# Patient Record
Sex: FEMALE | Race: OTHER
[De-identification: names, ages, dates, MRNs, and addresses within clinical notes are randomized per-mention and may not be internally consistent; named-entity substitution may affect disease eponyms.]

---

## 2018-04-11 ENCOUNTER — HOSPITAL ENCOUNTER (OUTPATIENT)
Dept: HOSPITAL 25 - OREAST | Age: 73
Discharge: HOME | End: 2018-04-11
Attending: SPECIALIST
Payer: MEDICARE

## 2018-04-11 VITALS — DIASTOLIC BLOOD PRESSURE: 54 MMHG | SYSTOLIC BLOOD PRESSURE: 129 MMHG

## 2018-04-11 DIAGNOSIS — J45.909: ICD-10-CM

## 2018-04-11 DIAGNOSIS — H35.3132: ICD-10-CM

## 2018-04-11 DIAGNOSIS — H25.812: Primary | ICD-10-CM

## 2018-04-11 DIAGNOSIS — H25.811: ICD-10-CM

## 2018-04-11 DIAGNOSIS — I10: ICD-10-CM

## 2018-04-11 DIAGNOSIS — E78.5: ICD-10-CM

## 2018-04-11 DIAGNOSIS — H40.1424: ICD-10-CM

## 2018-04-11 DIAGNOSIS — M81.0: ICD-10-CM

## 2018-04-11 DIAGNOSIS — M19.90: ICD-10-CM

## 2018-04-11 PROCEDURE — V2632 POST CHMBR INTRAOCULAR LENS: HCPCS

## 2018-04-11 NOTE — OP
OPERATIVE NOTE:

 

DATE OF OPERATION:  04/11/18

 

DATE OF BIRTH:  [birth year]

 

SURGEON:  Denton Romo M.D.

 

PREOPERATIVE DIAGNOSIS:  Cataract, left eye.

 

POSTOPERATIVE DIAGNOSIS:  Cataract, left eye.

 

OPERATIVE PROCEDURE:  Extracapsular cataract extraction with intraocular lens implant and CTR left ey
e.

 

PROCEDURE:  The patient was brought to the operating room after being given 1/2% Alcaine with epineph
rine drops in the preoperative area.  The eye was prepped and draped in the usual sterile fashion.  S
terile drape and eyelid speculum were placed.  Again, topical 1/2% Alcaine with epinephrine was given
.  A paracentesis incision was made at the 3 o'clock position with the No.75 blade.  Clear cornea inc
ision 2.2 x 2.2-mm was created at the 6 o'clock position starting at the anterior limbus using the 2.
2-mm keratome.  The anterior chamber was irrigated with 0.4 mL of 1% non-preservative intracameral li
docaine and filled with DisCoVisc.  A capsulorrhexis was completed using the cystotome and the Utrata
 forceps. Hydrodissection was performed with balanced salt solution.  The lens nucleus was removed wi
th the Phacoemulsification handpiece without incident.  Cortex was removed with the irrigation-aspira
tion handpiece.  The capsular bag was re-inflated using DisCoVisc and an SN60WF 18.5 implant was inse
rted with the shooter followed by a capsular tension ring.  A CTR 11 inserted with the shooter into t
he capsular bag.  Because of the small pupil, a Malyugin ring was placed prior to capsulorrhexis, rem
jose ramon after insertion of the capsular tension ring.  Indication for complex cataract surgery pseudoexf
oliation requiring capsular tension ring. Pupil abnormalities requiring pupil dilation device.  The i
rrigation-aspiration handpiece was used to remove all residual DisCoVisc.  The eye was refilled with 
balanced salt solution and the wound checked and found to be watertight.  Topical Maxitrol drops were
 given.

 

 [id number] #: [id number]

## 2018-08-08 ENCOUNTER — HOSPITAL ENCOUNTER (OUTPATIENT)
Dept: HOSPITAL 25 - OREAST | Age: 73
Discharge: HOME | End: 2018-08-08
Attending: SPECIALIST
Payer: MEDICARE

## 2018-08-08 VITALS — SYSTOLIC BLOOD PRESSURE: 127 MMHG | DIASTOLIC BLOOD PRESSURE: 73 MMHG

## 2018-08-08 DIAGNOSIS — J45.909: ICD-10-CM

## 2018-08-08 DIAGNOSIS — H25.811: Primary | ICD-10-CM

## 2018-08-08 DIAGNOSIS — E78.00: ICD-10-CM

## 2018-08-08 DIAGNOSIS — I10: ICD-10-CM

## 2018-08-08 DIAGNOSIS — E78.5: ICD-10-CM

## 2018-08-08 DIAGNOSIS — H35.3132: ICD-10-CM

## 2018-08-08 PROCEDURE — V2632 POST CHMBR INTRAOCULAR LENS: HCPCS

## 2018-08-09 NOTE — OP
DATE OF OPERATION:  08/08/18 [facility]

 

DATE OF BIRTH:  [birth year]

 

SURGEON:  Denton Romo M.D.

 

PREOPERATIVE DIAGNOSIS:  Cataract, right eye.

 

POSTOPERATIVE DIAGNOSIS:  Cataract, right eye.

 

OPERATIVE PROCEDURE:  Extracapsular cataract extraction with intraocular lens 
implant, right eye.

 

DESCRIPTION OF PROCEDURE:  The patient was brought to the operating room after 
being given 1/2% Alcaine with epinephrine drops in the preoperative area.  The 
eye was prepped and draped in the usual sterile fashion.  Sterile drape and 
eyelid speculum were placed.  Again, topical 1/2% Alcaine with epinephrine was 
given.  A paracentesis incision was made at the 9 o'clock position with the 
No.75 blade.  Clear cornea incision 2.2 x 2.2-mm was created at the 12 o'clock 
position starting at the anterior limbus using the 2.2-mm keratome.  The 
anterior chamber was irrigated with 0.4 mL of 1% non-preservative intracameral 
lidocaine and filled with DisCoVisc.  A capsulorrhexis was completed using the 
cystotome and the Utrata forceps. Hydrodissection was performed with balanced 
salt solution.  The lens nucleus was removed with the Phacoemulsification 
handpiece without incident.  Cortex was removed with the irrigation-aspiration 
handpiece.  The capsular bag was re-inflated using DisCoVisc and an SN60WF 17.5 
implant was inserted with the shooter. The irrigation-aspiration handpiece was 
used to remove all residual DisCoVisc. The eye was refilled with balanced salt 
solution and the wound checked and found to be watertight.  Topical Maxitrol 
drops were given.

 

 [id number]/CPS #: [id number]

[facility]

## 2019-07-22 NOTE — HP
HISTORY AND PHYSICAL:

 

DATE OF SURGERY:  07/30/19

 

DATE OF OFFICE VISIT:  07/17/19

 

SURGEON:  Shabana Vargas MD.* (DICTATED BY YOLANDA PIZANO)

 

PROCEDURE:  Left total hip arthroplasty.

 

CHIEF COMPLAINT:  Left hip pain.

 

HISTORY OF PRESENT ILLNESS:  Ms. Barillas is a 74-year-old female, who has 
known severe arthritis in her left hip.  She reports 6/10 aching pain in the 
left groin. This is a daily aching pain in the left groin.  She uses Tylenol 
and has had physical therapy in the past.  She is now doing some home 
exercises.  She does not use a cane.  She reports that her hip pain is 
increasing in intensity over the last 3 years.  She does report aquatherapy 
helped her.  She does not interested in intraocular injection at this time.

 

PAST MEDICAL HISTORY:

1.  Hypertension.

2.  Hypercholesterolemia.

3.  Osteoarthritis.

4.  GERD.

 

PAST SURGICAL HISTORY:  Bilateral cataract excision in 2018.

 

MEDICATIONS:

1.  Losartan potassium 25 mg one p.o. daily.

2.  Hydrochlorothiazide 12.5 mg one p.o. daily.

3.  Lansoprazole sodium 40 mg 1 p.o. daily.

4.  ______ 1200 mg 1 tab p.o. daily.

5.  Vitamin D p.o. daily.

6.  Vitamin D3 super strength 2000 units 2 tablets p.o. daily.

7.  Co Q10 at 30 mg p.o. daily.

 

ALLERGIES:  CORTISONE.

 

FAMILY HISTORY:  Coronary artery disease and hypertension.

 

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 
home.  She denies any tobacco or recreational drug use or alcohol use.

 

REVIEW OF SYSTEMS:  Negative for general, cephalic, CV, respiratory, GI, , or 
other musculoskeletal, integumentary, endocrine, neurologic, hematologic 
symptoms. Infectious Diseases:  Negative for MRSA, hep C, or HIV.  Negative for 
history of DVT, seizures, kidney disorders, TIA, or stroke.

 

                               PHYSICAL EXAMINATION

 

GENERAL:  Well-developed, well-nourished 74-year-old female, in no acute 
distress.

 

VITAL SIGNS:  Height 63 inches, weight 166 pounds, pulse 68, /78, 
respirations 14, temperature 97.1.  BMI 29.4.

 

HEENT:  Normocephalic, atraumatic.  PERRLA.  Extraocular movements intact.

 

NECK:  Supple.  No palpable lymph nodes.  Throat is clear.

 

PULMONARY:  Lungs are clear to auscultation bilaterally.  No wheezes, rales, 
rhonchi.

 

CARDIO:  Regular rate and rhythm.  S1, S2 normal.  No murmurs, rubs, or 
gallops. No edema.

 

ABDOMEN:  Positive bowel sounds, soft, nontender.

 

MUSCULOSKELETAL:  Left lower extremity, the patient's skin is intact.  No 
abrasions or open wounds.  No palpable masses or lymph nodes.  Range of motion, 
hip flexion is 90 degrees with some groin pain.  She lacks 5 degrees from 
neutral.  She has 20 degrees external rotation with groin pain.  She can 
actively flex and abduct the hip.  Distally, there is no edema, varicosities, 
or hyperreflexia.  She has a 5/5 strength for ankle dorsiflexion and plantar 
flexion bilaterally.  Sensation to light touch is intact with 2+ DP pulses 
bilaterally.

 

NEUROLOGIC:  A and O x3.  Cranial nerves II through XII intact.  Sensation is 
intact to light touch.

 

 DIAGNOSTIC DATA/LAB DATA:  Radiographs:  Multiple views of the left hip shows 
severe endstage arthritis with medial bone-on-bone contact, joint space 
narrowing, osteophyte formation, and subchondral sclerosis.

 

IMPRESSION:  Left hip severe osteoarthritis.

 

PLAN:  The patient is scheduled to undergo left total hip replacement on 07/26/
19. Dr. Vargas discussed the procedure as well as risks and benefits with the 
patient. She agreed to proceed.  She will return to the office 10 to 14 days 
postop for followup and suture removal.  Upon discharge from the hospital, she 
will be e- scribed a prescription for Percocet for postoperative pain 
management.  I-STOP was performed today in the office and was negative.

 

 ____________________________________ YOLANDA PIZANO

 

[id number]/CPS #: [id number]

JOYCE

## 2019-07-26 ENCOUNTER — HOSPITAL ENCOUNTER (INPATIENT)
Dept: HOSPITAL 25 - AA | Age: 74
LOS: 6 days | Discharge: HOME HEALTH | DRG: 470 | End: 2019-08-01
Attending: ORTHOPAEDIC SURGERY | Admitting: ORTHOPAEDIC SURGERY
Payer: MEDICARE

## 2019-07-26 DIAGNOSIS — M85.88: ICD-10-CM

## 2019-07-26 DIAGNOSIS — Z82.49: ICD-10-CM

## 2019-07-26 DIAGNOSIS — I49.3: ICD-10-CM

## 2019-07-26 DIAGNOSIS — J30.2: ICD-10-CM

## 2019-07-26 DIAGNOSIS — K44.9: ICD-10-CM

## 2019-07-26 DIAGNOSIS — E83.42: ICD-10-CM

## 2019-07-26 DIAGNOSIS — Z72.89: ICD-10-CM

## 2019-07-26 DIAGNOSIS — E78.00: ICD-10-CM

## 2019-07-26 DIAGNOSIS — K21.9: ICD-10-CM

## 2019-07-26 DIAGNOSIS — R94.31: ICD-10-CM

## 2019-07-26 DIAGNOSIS — I10: ICD-10-CM

## 2019-07-26 DIAGNOSIS — I08.1: ICD-10-CM

## 2019-07-26 DIAGNOSIS — Z88.8: ICD-10-CM

## 2019-07-26 DIAGNOSIS — I47.2: ICD-10-CM

## 2019-07-26 DIAGNOSIS — Z98.42: ICD-10-CM

## 2019-07-26 DIAGNOSIS — Z98.41: ICD-10-CM

## 2019-07-26 DIAGNOSIS — E78.5: ICD-10-CM

## 2019-07-26 DIAGNOSIS — M54.30: ICD-10-CM

## 2019-07-26 DIAGNOSIS — M16.12: Primary | ICD-10-CM

## 2019-07-26 DIAGNOSIS — D64.9: ICD-10-CM

## 2019-07-26 PROCEDURE — 93306 TTE W/DOPPLER COMPLETE: CPT

## 2019-07-26 PROCEDURE — 80053 COMPREHEN METABOLIC PANEL: CPT

## 2019-07-26 PROCEDURE — 80048 BASIC METABOLIC PNL TOTAL CA: CPT

## 2019-07-26 PROCEDURE — 93005 ELECTROCARDIOGRAM TRACING: CPT

## 2019-07-26 PROCEDURE — 85014 HEMATOCRIT: CPT

## 2019-07-26 PROCEDURE — 85018 HEMOGLOBIN: CPT

## 2019-07-26 PROCEDURE — C1776 JOINT DEVICE (IMPLANTABLE): HCPCS

## 2019-07-26 PROCEDURE — 82310 ASSAY OF CALCIUM: CPT

## 2019-07-26 PROCEDURE — 36415 COLL VENOUS BLD VENIPUNCTURE: CPT

## 2019-07-26 PROCEDURE — C1713 ANCHOR/SCREW BN/BN,TIS/BN: HCPCS

## 2019-07-26 PROCEDURE — 85025 COMPLETE CBC W/AUTO DIFF WBC: CPT

## 2019-07-26 PROCEDURE — 84484 ASSAY OF TROPONIN QUANT: CPT

## 2019-07-26 PROCEDURE — 85049 AUTOMATED PLATELET COUNT: CPT

## 2019-07-26 PROCEDURE — 83735 ASSAY OF MAGNESIUM: CPT

## 2019-07-30 LAB
ALBUMIN SERPL BCG-MCNC: 4 G/DL (ref 3.2–5.2)
ALBUMIN/GLOB SERPL: 1.5 {RATIO} (ref 1–3)
ALP SERPL-CCNC: 48 U/L (ref 34–104)
ALT SERPL W P-5'-P-CCNC: 20 U/L (ref 7–52)
ANION GAP SERPL CALC-SCNC: 8 MMOL/L (ref 2–11)
AST SERPL-CCNC: 32 U/L (ref 13–39)
BASOPHILS # BLD AUTO: 0 10^3/UL (ref 0–0.2)
BUN SERPL-MCNC: 16 MG/DL (ref 6–24)
BUN/CREAT SERPL: 19.8 (ref 8–20)
CALCIUM SERPL-MCNC: 9 MG/DL (ref 8.6–10.3)
CALCIUM SERPL-MCNC: 9.4 MG/DL (ref 8.6–10.3)
CHLORIDE SERPL-SCNC: 103 MMOL/L (ref 101–111)
EOSINOPHIL # BLD AUTO: 0.1 10^3/UL (ref 0–0.6)
GLOBULIN SER CALC-MCNC: 2.7 G/DL (ref 2–4)
GLUCOSE SERPL-MCNC: 101 MG/DL (ref 70–100)
HCO3 SERPL-SCNC: 29 MMOL/L (ref 22–32)
HCT VFR BLD AUTO: 37 % (ref 35–47)
HGB BLD-MCNC: 12.6 G/DL (ref 12–16)
LYMPHOCYTES # BLD AUTO: 1.7 10^3/UL (ref 1–4.8)
MAGNESIUM SERPL-MCNC: 1.6 MG/DL (ref 1.9–2.7)
MCH RBC QN AUTO: 31 PG (ref 27–31)
MCHC RBC AUTO-ENTMCNC: 34 G/DL (ref 31–36)
MCV RBC AUTO: 92 FL (ref 80–97)
MONOCYTES # BLD AUTO: 0.5 10^3/UL (ref 0–0.8)
NEUTROPHILS # BLD AUTO: 5.9 10^3/UL (ref 1.5–7.7)
NRBC # BLD AUTO: 0 10^3/UL
NRBC BLD QL AUTO: 0
PLATELET # BLD AUTO: 222 10^3/UL (ref 150–450)
POTASSIUM SERPL-SCNC: 4.1 MMOL/L (ref 3.5–5)
PROT SERPL-MCNC: 6.7 G/DL (ref 6.4–8.9)
RBC # BLD AUTO: 4.01 10^6 /UL (ref 3.7–4.87)
SODIUM SERPL-SCNC: 140 MMOL/L (ref 135–145)
WBC # BLD AUTO: 8.2 10^3/UL (ref 3.5–10.8)

## 2019-07-30 PROCEDURE — 0SRB04A REPLACEMENT OF LEFT HIP JOINT WITH CERAMIC ON POLYETHYLENE SYNTHETIC SUBSTITUTE, UNCEMENTED, OPEN APPROACH: ICD-10-PCS | Performed by: ORTHOPAEDIC SURGERY

## 2019-07-30 RX ADMIN — MAGNESIUM HYDROXIDE SCH ML: 400 SUSPENSION ORAL at 22:19

## 2019-07-30 RX ADMIN — ACETAMINOPHEN SCH MG: 325 TABLET ORAL at 16:18

## 2019-07-30 RX ADMIN — OXYCODONE HYDROCHLORIDE AND ACETAMINOPHEN PRN TAB: 5; 325 TABLET ORAL at 17:56

## 2019-07-30 RX ADMIN — HYDROMORPHONE HYDROCHLORIDE PRN MG: 1 INJECTION, SOLUTION INTRAMUSCULAR; INTRAVENOUS; SUBCUTANEOUS at 14:01

## 2019-07-30 RX ADMIN — DOCUSATE SODIUM SCH MG: 100 CAPSULE, LIQUID FILLED ORAL at 22:19

## 2019-07-30 RX ADMIN — OXYCODONE HYDROCHLORIDE AND ACETAMINOPHEN PRN TAB: 5; 325 TABLET ORAL at 17:03

## 2019-07-30 RX ADMIN — HYDROMORPHONE HYDROCHLORIDE PRN MG: 1 INJECTION, SOLUTION INTRAMUSCULAR; INTRAVENOUS; SUBCUTANEOUS at 14:06

## 2019-07-30 RX ADMIN — CEFAZOLIN SCH MLS/HR: 1 INJECTION, POWDER, FOR SOLUTION INTRAVENOUS at 17:03

## 2019-07-30 RX ADMIN — PANTOPRAZOLE SODIUM SCH MG: 40 TABLET, DELAYED RELEASE ORAL at 17:03

## 2019-07-30 RX ADMIN — APIXABAN SCH MG: 2.5 TABLET, FILM COATED ORAL at 22:18

## 2019-07-30 RX ADMIN — HYDROMORPHONE HYDROCHLORIDE PRN MG: 1 INJECTION, SOLUTION INTRAMUSCULAR; INTRAVENOUS; SUBCUTANEOUS at 14:13

## 2019-07-30 RX ADMIN — HYDROMORPHONE HYDROCHLORIDE PRN MG: 1 INJECTION, SOLUTION INTRAMUSCULAR; INTRAVENOUS; SUBCUTANEOUS at 13:06

## 2019-07-30 RX ADMIN — HYDROMORPHONE HYDROCHLORIDE PRN MG: 1 INJECTION, SOLUTION INTRAMUSCULAR; INTRAVENOUS; SUBCUTANEOUS at 13:21

## 2019-07-30 RX ADMIN — SODIUM CHLORIDE, SODIUM LACTATE, POTASSIUM CHLORIDE, AND CALCIUM CHLORIDE SCH MLS/HR: 600; 310; 30; 20 INJECTION, SOLUTION INTRAVENOUS at 17:14

## 2019-07-30 NOTE — CONS
CC:  Dr. Velasquez; Dr. Ortiz; Dr. Vargas

 

CONSULTATION REPORT:

 

DATE OF CONSULT:  07/30/19

 

PRIMARY CARE PROVIDER:  Dr. Velasquez.

 

REASON FOR CONSULT:  Medical management of postoperative patient of Dr. Vargas's.

 

CHIEF COMPLAINT:  Left hip pain.

 

HISTORY OF PRESENT ILLNESS:  Graciela Barillas is a 74-year-old female with 
history of hypertension, osteoarthritis, dyslipidemia who was seen in the 
postoperative unit after a left total hip replacement performed by Dr. Vargas 
today.  Her surgery was mainly uneventful apart from that that the 
anesthesiologist noted that the patient had ST depression in lateral leads that 
resolved by the time of the end of her surgery.  When the patient was awoken 
from anesthesia, she had no complaints of chest pain or shortness of breath. 
She had spinal anesthesia and sedation.  The patient right now complains of 
postoperative pain in the left hip.  She has no other complaints.  The patient 
has history of cardiac catheterization obtained in 2010 which was unremarkable. 
She sees Dr. Ortiz for her cardiac issues.

 

PAST MEDICAL HISTORY:

1.  History of hypertension.

2.  Hyperlipidemia.

3.  History of PVCs.

4.  History of osteoarthritis of the left hip, status post left hip replacement 
performed on 07/30/19.

5.  History of cardiac catheterization in 2010 which was unremarkable.

 

MEDICATIONS AT HOME:  Include:

1.  Coenzyme Q10 30 mg a day.

2.  Vitamin D3 2000 units, the patient actually takes 4000 units daily.

3.  Vitamin B 100 daily.

4.  Losartan 25 mg daily.

5.  Hydrochlorothiazide 12.5 mg daily.

6.  Protonix 40 mg daily.

7.  Lecithin 1200 mg daily.

 

ALLERGIES:  Include CORTISONE.

 

FAMILY HISTORY:  Father with history of MIs and atrial fibrillation.  Mother 
with history of hypertension, benign kidney tumor.  One of the patient's 
brothers had bypass.

 

SOCIAL HISTORY:  The patient is retired as a realtor.  She denies any tobacco, 
alcohol, or drug use.  As her surrogate, she names her daughter.

 

REVIEW OF SYSTEMS:  Please see history of present illness.  All the remaining 
12 systems were reviewed with the patient and were otherwise negative.

 

PHYSICAL EXAM:  Blood pressure of 137/71, heart rate of 59 beats per minute and 
regular, respiratory rate 15, oxygen saturation 95% on room air, temperature of 
96.8.  General:  The patient is a very pleasant 74-year-old female who is in no 
acute distress, alert, awake, and oriented x3.  HEENT:  Head:  Atraumatic, 
normocephalic.  Eyes:  Pupils are equal, reactive to light and accommodation. 
Oropharynx is clear.  Mucosa moist.  Neck:  Supple.  No JVD.  No bruits 
bilaterally.  Cardiovascular:  Regular rate and rhythm.  No murmur.  Respiratory
: Clear to auscultation bilaterally.  Abdomen:  Soft, nontender.  Bowel sounds 
are present in all 4 quadrants.  Extremities:  There is no edema.  Pulses are 2
+ bilaterally.  No clubbing or cyanosis.  The left hip is in postoperative 
dressings and that were not removed for evaluation.  Neuro Evaluation:  Speech 
is clear. Cranial nerves II through XII are grossly intact.  Motor strength is 5
/5 bilaterally.

 

DIAGNOSTIC STUDIES/LAB DATA:  Laboratory data at this time none.

 

The patient's EKG showed normal sinus rhythm with ST depressions in lateral 
leads, unchanged from EKG from June 2019.

 

ASSESSMENT AND PLAN:

1.  Postoperative left hip, that is going to be managed per orthopedic service. 
The patient already was placed on Eliquis.

2.  In regards to the patient's history of hypertension, hydrochlorothiazide 
and losartan are going to be held on the first day postoperatively.

3.  EKG changes, intraoperative.  At this point, we will obtain troponin.  The 
patient's EKG is unchanged from prior.  If the patient's troponin is 
unremarkable, the patient likely will be okay on surgical floor with telemetry 
monitoring.  If the patient's troponin is elevated, the patient will require to 
be placed on cardiac floor on telemetry monitoring.  At this point, the patient 
is asymptomatic.

4.  For DVT prophylaxis, the patient is going to be continued on Eliquis, 
already started by surgical team.

 

TIME SPENT:  Approximately 55 minutes was spent in consultation of this patient
, more than half the time was spent face-to-face with the patient during the 
interview, physical exam.

 

Thank you very much for allowing our service to participate in your patient's 
care. We will see the patient on a daily basis.

 

 [id number]/CPS #: [id number]

JOYCE

## 2019-07-30 NOTE — OP
Operative Report - Blank





- Operative Report


Date of Operation: 07/30/19


Note: 





JERZY CARDOZA





1945





Date Of Surgery: 7/30/19





Shabana Vargas MD





Assistant: UMA GUERRERO did help throughout the procedure with preparation 

of the hip, wound retraction, manipulation of the hip, and wound closure.  





Anesthesiologist: KONRAD Berry MD





Anesthesia Type: Spinal 





Preoperative Diagnosis: Left severe degenerative osteoarthritis of the hip





Postoperative Diagnosis: As above





Procedure Performed: Left Total Hip Arthroplasty





Complications: None





Specimen: Femoral head and acetabular reamings sent to pathology. 


   


Hardware used: This is uncemented Zain total hip arthroplasty hardware  for 

the femur a size 5 accolade II with 127 degree neck femoral component, for the 

acetabulum a size 50D trident II tritanium  cluster hole shell, two screws - 

length 15mm and 20 mm,  for the insert a size 36D polyethylene trident x3 insert

, and for the femoral head a size 36 - 2.5 ceramic biolox V40 femoral head.  





Brief history/Indication:  JERZY CARDOZA was known in clinic and had a 

history of severe left hip pain. She failed conservative treatment with anti-

inflammatories, pain pills, intra-articular injections and physical therapy. 

She elected to undergo left total hip arthroplasty due to continued pain and 

decreased quality of life. Radiographs showed severe end stage osteoarthritis 

of the hip with bone on bone contact.  Informed consent was obtained from the 

patient. She understood the risks of surgery included but were not limited to: 

bleeding, infection, damage to nearby structures, intraoperative fracture, 

nerve palsy, failure of the hardware, early loosening, stiffness or loss of 

motion, dislocation, leg length discrepancy, anesthesia complications, stroke, 

heart attack, blood clot and death. She wished to proceed.  





Intra-Operative findings: Intraoperatively the patient was noted to have severe 

loss of cartilage of the acetabulum and femoral head.  She had extreme wear 

medially in the acetabulum with extensive sclerosis of the bone and thinning of 

the posterior wall.  She was noted to have osteopenia of the femoral bone.    





Description of the Procedure: 





JERZY CARDOZA was identified in the preanesthesia unit. Her left hip was 

marked as the correct operative side. Informed consent was signed and placed in 

the chart. The patient was taken to the operating room and placed under 

anesthesia without complication. A wilburn catheter was placed. The patient was 

placed on the peg board with all bony prominences well padded. The left lower 

extremity was prepped and draped in the usual sterile fashion. Preoperative time

-out was made to correctly identify the patient, side and site. Appropriate 

intraoperative antibiotics were given within one hour of incision.  





A standard posterior incision was made and carried sharply down to the lateral 

fascia. A new 10 blade was used to make an incision in the fascia in line with 

the skin incision. A charnley retractor was placed.  The piriformis and 

conjoined tendons were identified and elevated off the posterolateral femur 

using electrocautery. These were tagged with number 5 Ethibond. Next 

electrocautery was used to make a posterolateral capsular flap and this was 

tagged with number 5 Ethibonds. The hip was carefully dislocated. 


   


Lesser trochanter to the center of the femoral head was measured at 55 mm. The 

oscillating saw was used to make the femoral neck cut. The femoral head was 

carefully removed. The femur was retracted anteriorly and the acetabular 

retractors were placed. Long-handled knife was used to sharply remove any 

remaining labrum from the acetabular rim. The acetabulum was sequentially 

reamed up to a size 49.  A bleeding subchondral bone bed was obtained. A trial 

liner was placed and had excellent fit and stability. A trident II tritanium 

50D with 2 screws was placed and had excellent stability with appropriate 

anteversion and abduction angle. A size 36D polyethylene liner was impacted 

into the acetabular shell. The liner was checked for stability and was stable.  





Next attention was turned to preparation of the femoral canal. A canal finder 

was used to enter the proximal femur. The femoral canal was sequentially 

broached up to a size 5 femoral broach trial. A trial neck and 36 - 2.5 trial 

femoral head was chosen. Lesser trochanter to center of the femoral head 

measurement was satisfactory. The hip was reduced and taken through a range of 

motion.  The hip was stable in all positions with good soft tissue tension and 

appropriate leg lengths. The hip was dislocated and all trials were removed.  





The final implant chosen was a accolade II size 5 with 127 degree neck. This 

stem was impacted into the femoral canal without difficulty. The stem was 

stable with appropriate anteversion. The femoral head chosen was a 36 - 2.5 

ceramic head.   The head was impacted onto the femoral neck without difficulty. 

The final lesser trochanter to center of the femoral head measurement was 

satisfactory. The hip was reduced and taken through a range of motion. The hip 

was stable in all positions with good soft tissue tension and appropriate leg 

lengths. The hip was copiously irrigated with sterile saline.  





The previously tagged capsule and tendons were repaired to the posterolateral 

femur through two trochanteric drill holes. The lateral fascia layer was closed 

using number 1 vicryls. The rest of the incision was closed in a layered 

fashion using 0 and 2-0 vicryls. The skin was closed using 3-0 monocryl suture 

and Dermabond.  Sterile adaptic, 4x4s and paper tape was used to cover the 

incision. The patients anesthesia was reversed without difficulty. She was 

taken to the PACU in stable condition. Intended weight-bearing will be as 

tolerated with posterior hip precautions.

## 2019-07-30 NOTE — PN
Progress Note





- Progress Note


Date of Service: 07/30/19


Note: 


Patient seen in PACU s/p Left total hip arthroplasty.  Family present at 

bedside.  States she will be monitored in tele over night.  She is alert and 

oriented.  Just starting to have some left hip pain.  Denies nausea. + DF left 

foot.  Sensation and circulation intact distally.

## 2019-07-30 NOTE — ECHO
*[facility]*

[place] [address]

Phone: [phone]

Fax #: [phone]



-------------------------------------------------------------------

Transthoracic Echocardiogram



Patient: Graciela Barillas                  MRN:        [id number]

:     1945                         Study Date: 2019

Age:     74                                 Accession#: [id number]

Gender:  F                                  HR:         57 bpm

Height:  63 in /160 cm                      BSA:        1.78 m^2

Weight:  163.7 lb /74.4 kg                  BMI:        29.1 kg/m^2



*Sonographer: * Niurka [facility]

 

*Referring Physician: * Kori Castelan

*Reading Physician: * Scott Ramirez MD



-------------------------------------------------------------------

Indications:   Abnormal EKG.



-------------------------------------------------------------------

History:   Ventricular ectopy.  Risk factors:  Hypertension.

Dyslipidemia.



-------------------------------------------------------------------

Conclusions



Summary:



- Left ventricle: Systolic function is normal. The estimated

  ejection fraction is 55-60%. Wall motion is normal; there are no

  regional wall motion abnormalities.

- Mitral valve: There is trace regurgitation.

- Aortic valve: There is no evidence of stenosis. There is no

  significant regurgitation.

- Tricuspid valve: There is mild regurgitation.

- Pericardium, extracardiac: There is no significant pericardial

  effusion.

- Study data: No prior study is available for comparison.



-------------------------------------------------------------------

Study data:  Transthoracic echocardiogram.  Procedure:

Transthoracic echocardiography was performed. Image quality was

fair. The study was technically limited due to restricted patient

mobility. S/P left hip replacement.  Complete 2D, spectral Doppler,

and color flow Doppler.  Location:  Recovery room.  Patient status:

 Inpatient. Patient room number: 14. No prior study is available

for comparison.  Rhythm:  Bradycardia.



-------------------------------------------------------------------

Findings



Left ventricle:  The cavity size is normal. Wall thickness is

mildly increased. Systolic function is normal. The estimated

ejection fraction is 55-60%. Wall motion is normal; there are no

regional wall motion abnormalities. There is no consistent Doppler

evidence of clinically significant diastolic dysfunction.

Right ventricle:  The cavity size is normal. Systolic function is

normal.

Left atrium:  The atrium is at the upper limits of normal in size.

Right atrium:  The atrium is normal in size.

Mitral valve:  The leaflets are normal thickness.  There is no

evidence of stenosis.   There is trace regurgitation.

Aortic valve:  The leaflets are normal thickness.  There is no

evidence of stenosis.   There is no significant regurgitation.

Tricuspid valve:  The leaflets are normal thickness.  There is no

evidence of stenosis.   There is mild regurgitation.

Pulmonic valve:   Not well visualized. There is no significant

regurgitation.

Aorta:  Aortic root: The aortic root is poorly visualized.

The aortic arch appears normal.

Pericardium:  There is no significant pericardial effusion.

Pulmonary arteries:

Not well visualized. Systolic pressure is within the normal range.

Systemic veins:

Inferior vena cava: The vessel is normal in size. There is (&gt;= 50%)

respiratory change in the IVC dimension.



-------------------------------------------------------------------

Measurements



 Left ventricle              Value        Ref       Aortic valve continued      Value       Ref

 JOJO, LAX            (L)     3.7   cm     3.8 -     VTI, S                      27.4  cm    -----

                                          5.2       Mean grad, S                2.0   mm Hg -----

 ESD, LAX                    2.7   cm     2.2 -     Peak grad, S                4.0   mm Hg -----

                                          3.5

 FS, LAX                     27    %      27 - 45   Mitral valve                Value       Ref

 PW, ED, LAX         (H)     1.0   cm     0.6 -     Peak E                      0.65  m/sec -----

                                          0.9       Peak A                      0.85  m/sec -----

 EF                  (L)     53    %      54 - 74   Decel time                  227   ms    -----

 E&apos;, lat alex, TDI    (L)     6.4   cm/sec &gt;=10.0    Peak E/A ratio              0.8         -
----

 E/e&apos;, lat alex, TDI          10           --------

 E&apos;, med alex, TDI    (L)     6.7   cm/sec &gt;=7.0     Pulmonic valve              Value       R
ef

 E/e&apos;, med alex, TDI          10           --------  Peak v, S                   0.41  m/sec ----
-

 E&apos;, avg, TDI                6.6   cm/sec --------  Peak grad, S                1.0   mm Hg ----
-

 E/e&apos;, avg, TDI              10           &lt;=14

                                                    Tricuspid valve             Value       Ref

 LVOT                        Value        Ref       TR peak v                   2.3   m/sec &lt;=2.8

 Peak lili, S                 0.84  m/sec  --------  Peak RV-RA grad, S          21    mm Hg -----

 Mean grad, S                1     mm Hg  --------

                                                    Aortic arch                 Value       Ref

 Ventricular septum          Value        Ref       Arch diam                   2.5   cm    -----

 IVS, ED             (H)     1.1   cm     0.6 -

                                          0.9       Decending aorta             Value       Ref

                                                    Ej peak lili                0.38  m/sec -----

 Right ventricle             Value        Ref

 JOJO, LAX                    3.2   cm     --------  Pulmonary artery            Value       Ref

 JOJO minor ax, A4C   (H)     3.8   cm     1.9 -     Pressure, S                 28.0  mm Hg -----

 mid                                      3.5

 Pressure, S                 29    mm Hg  --------  Inferior vena cava          Value       Ref

                                                    Diam                        1.7   cm    -----

 Left atrium                 Value        Ref

 ML dim, A4C                 3.8   cm     --------  Pulmonary veins             Value       Ref

 SI dim, A4C                 5.5   cm     --------  Peak v, S                   0.56  m/sec -----

 Vol/bsa, ES, A/L            34    ml/m^2 16 - 34   Peak v, D                   0.32  m/sec -----

                                                    Peak S/D ratio              1.7         -----

 Right atrium                Value        Ref       A rev duration              127   ms    -----

 SI dim, ES                  4.1   cm     3.4 -

                                          5.3

 ML dim, ES, A4C             4.0   cm     2.6 -

                                          4.4

 Estimated RAP               8     mm Hg  --------

 

 Aortic valve                Value        Ref

 Peak v, S                   1.06  m/sec  --------

 

Legend:

(L)  and  (H)  tsering values outside specified reference range.



Prepared and electronically signed by



Scott Ramirez MD

2019 16:20

## 2019-07-30 NOTE — XMS REPORT
Continuity of Care Document (CCD)

 Created on:2019



Patient:Jerzy Barillas

Sex:Female

:1945

External Reference #:[id number]





Demographics







 Address  [address] [place] [address]

 

 Home Phone  [phone]

 

 Mobile Phone  [phone]

 

 Work Phone  [phone]    ;ext=mobil

 

 Email Address  [email or web address]

 

 Preferred Language  en

 

 Marital Status  Not  or 

 

 Druze Affiliation  Unknown

 

 Race  White

 

 Ethnic Group  Not  or 









Author







 Name  Billy Velasquez MD

 

 Address  [address] [place] [address]









Support







 Name  Relationship  Address  Phone

 

 Aditi Barillas  Child  Unavailable  [phone]

 

 Sheela Barillas  Child  Unavailable  [phone]









Care Team Providers







 Name  Role  Phone

 

 Billy Velasquez MD FACP  Primary Care Physician  Unavailable









Payers







 Date  Identification Numbers  Payment Provider  Subscriber

 

   Policy Number: [id number]  [employer]-PFFS Medicare Plan  Jerzy Barillas









 Group Number: [id number]   [address] [id number]

 

 PayID: [id number]  [place] [id number]







Problems







 Active Problems  Provider  Date

 

 Acquired kyphosis  Billy Velasquez MD  Onset: 2015

 

 Degenerative joint disease involving multiple  Billy Velasquez MD  Onset: 



 joints    

 

 Bunion  Billy Velasquez MD  Onset: 2015

 

 Essential hypertension  Billy Velasquez MD  Onset: 2015

 

 Pure hypercholesterolemia  Billy Velasquez MD  Onset: 2015

 

 Osteochondropathy  Billy Velasquez MD  Onset: 2015

 

 Localized, primary osteoarthritis of the  Billy Velasquez MD  Onset: 05/15/
2019



 pelvic region and thigh    

 

 Other specified diseases of esophagus  Billy Velasquez MD  Onset: 02/15/2016

 

 Gastroesophageal reflux disease  Billy Velasquez MD  Onset: 02/15/2016

 

 Essential hypertension  Billy Velasquez MD  Onset: 2015

 

 Osteopetrosis  Billy Velasquez MD  Onset: 2015

 

 Vitamin D deficiency  Billy Velasquez MD  Onset: 2015







Family History







 Date  Family Member(s)  Observation  Comments

 

 :  (age 77  Father   due to Unknown  " in sleep"



 Years)    Causes  

 

   Father  Atrial Fibrillation  

 

   Mother  Hypertension  After death of 18 year



       old son in an



       accident.

 

 :  (age 66  Mother   due to Unknown   soon after an



 Years)    Causes  elective surgery for



       kidney neoplasm that



       was benign.







Social History







 Type  Date  Description  Comments

 

 Birth Sex    Unknown  

 

 Marital Status      

 

 Occupation    Real Estate  

 

 Work Status    Currently Working  

 

 ETOH Use    Negative For Never used  



     alcohol  

 

 Tobacco Use  Start: Unknown  Patient has never smoked  

 

 Recreational Drug Use    Negative For Never Used  



     Drugs  

 

 Exercise Type/Frequency    Does not exercise  Active in life but no



       regular exercise.







Allergies, Adverse Reactions, Alerts







 Inactive Allergies  Reaction  Severity  Comments  Date

 

 Cortisone  "face and neck was  Mild  S/P knee injection,  2015



   red"    tolerated prednisone in  



       2015  







Medications







 Active Medications  SIG  Qnty  Indications  Ordering  Date



         Provider  

 

 Losartan Potassium  Take One Tablet  90tabs  I10  Cervantes A  2017



             25mg Tablets  By Mouth Every      MD Ron  



   Evening        

 

 Tylenol Extra Strength  Take 2 tablets    M15.0  Cervantes A  2016



                 500mg  by mouth every      MD Ron  



 Tablets  8 hours as        



   needed for pain        

 

 Hydrochlorothiazide  Take One  90caps  I10  Cervantes A  2016



              12.5mg  Capsule By      MD Ron  



 Capsules  Mouth Every Day        



   In The Morning        



   For High Blood        



   Pressure        

 

 Vitamin B Complex-C  1 by mouth      Cervantes A  02/15/2016



               Capsules  every day      MD Ron  



           

 

 Pantoprazole Sodium  Take One Tablet  90tabs  K21.9  Cervantes A  02/15/2016



              20mg Tablets  By Mouth Every      MD Ron  



   Kimi        

 

 Vitamin D3  2 by mouth    E55.9  Cervantes A  2015



     2000Unit Chewtabs  every day      MD Ron  



           









 Q78.2

 

 History Medications









 Prevnar 13  administer half a  1units  Z00.01  Billy A  10/29/2018 -



   milliliters      MD Ron  2019



 Suspension  intramuscularly one        



   time for inactivated        



   vaccination to prevent        



   pneumococcal infection        

 

 Shingrix  one intramuscular dose  1units  Z00.01  Billy SHIELDS  10/29/2018 -



   now and repeat 2nd      MD Ron  2019



 50mcg/0.5ML  dose after 2-6 months.        



 Suspension Rec          



           

 

 Simvastatin  1 by mouth every day    E78.00  Angel,  2017 -



              20mg  every night at bedtime      Qutaybeh, MD  2017



 Tablets          



           

 

 Flovent HFA  inhale 1 puff by mouth  12gm  J98.01  Billy SHIELDS  2016 -



   2 times per day for      MD Ron  10/31/2016



 110mcg/Act Aerosol  bronchospasm        



           









 R05









 Prednisone  take 2 tablets  14tabs  J98.01  Billy SHIELDS  2016 -



            10mg  by mouth every      MD Ron  2016



 Tablets  day with food        



   for 5 days.        









 R05









 Losartan Potassium  take one tablet  90tabs  I10  Billy SHIELDS  2016 -



             25mg Tablets  by mouth in the      MD Ron  2017



   evening for high        



   blood pressure        

 

 Amoxicillin  Take 1 capsule  30caps    Billy SHIELDS  2016 -



      500mg Capsules  by mouth every 8      MD Ron  2016



   hours for 10        



   days for        



   infection        

 

 Losartan  take one tablet  90tabs  I10  Billy SHIELDS  10/22/2015 -



 Potassium/Hydrochlorothiaz  by mouth once      MD Ron  2016



 cynthia  daily for high        



 50-12.5mg Tablets  blood pressure        



           

 

 Hydrochlorothiazide  Take 1 tablet by  30tabs  I10  Billy SHIELDS  2015 -



              12.5mg  mouth daily for      MD Ron  10/21/2015



 Tablets  high blood        



   pressure        

 

 Vitamin D3  1 by mouth every    E55.9  Cervantes A  2015 -



     2000Unit Capsules  day      MD Ron  2015



           









 Q78.2









 Losartan Potassium  Take 1 tablet by mouth    I10  Angel,  2013 -



   daily for high blood      Qutaybeh, MD  10/22/2015



 50mg Tablets  pressure        



           

 

 Simvastatin  Take 1 tablet by mouth    E78.0  Angel,  2013 -



             20mg  daily in the evening      Qutaybeh, MD  2015



 Tablets  for hyperlipidemia        



           

 

 Montelukast Sodium  take one tablet by    995.3  Chavo Torrez  2010 -



   mouth in the evening      A., MD  2016



 10mg Tablets  for hayfever        



           

 

 Fosamax  1 by mouth every week    Q78.2  Lorena Washington  2010 -



         70mg  before meals      E., MD  2014



 Tablets          



           

 

 Pantoprazole Sodium  Take 1 tablet by mouth    K21.9  Rich Lamb,   -



   daily for      MD  02/15/2016



 40mg Tablets DR  gastroesophageal reflux        



   disease        

 

 Atenolol  Take 1 tablet by mouth    E78.0  Angel,  1980 -



          25mg  daily for high blood      Qutaybeh, MD  2015



 Tablets  pressure        



           

 

 Lipitor  Take 1 tablet by mouth    E78.0  Unknown  1980 -



         10mg  daily for high        2013



 Tablets  cholesterol        



           







Immunizations







 CPT Code  Status  Date  Vaccine  Lot #

 

 [id number]  Given  2018  Prevnar (Pneumonia) 13 Vaccine  

 

 63413  Given  10/03/2018  Influenza Virus Vaccine, Quadrivalent (Cciiv4),  
[id number]



       Derived From Cell  

 

 21179  Given  10/02/2017  Influenza Vaccine Quadrivalent Preser/Antibiotic  
[id number]



       Free Im Use  

 

 72384  Given  2016  Influenza Virus Split 3 Yrs And Above For  [id number]



       Intramuscular Use  

 

 10099  Given  10/18/2015  Influenza Virus Split 3 Yrs And Above For  



       Intramuscular Use  

 

 71325  Given  2015  Pneumococcal Vaccine 2Yrs Or Older  [id number]

 

 24895  Given  2015  Tdap-Tetanus, Diphtheria Toxoids/Acellular  [id number]



       Pertussis Vaccine 7+  

 

 66817  Given  10/01/2014  Zoster Shingles Vaccine For Subcutaneous Injection  







Vital Signs







 Date  Vital  Result  Comment

 

 2019 12:54pm  Heart Rate  70 /min  









 BP Systolic  130 mmHg  

 

 BP Diastolic  80 mmHg  

 

 Weight  168.00 lb  









 10/29/2018  1:07pm  Body Temperature  98.2 F  









 O2 % BldC Oximetry  98 %  

 

 Heart Rate  73 /min  

 

 BP Systolic  128 mmHg  

 

 BP Diastolic  65 mmHg  

 

 BMI (Body Mass Index)  32.3 kg/m2  

 

 Weight  171.00 lb  

 

 Height  61 inches  5'1"









 2018 11:42am  Heart Rate  72 /min  









 BP Systolic  130 mmHg  

 

 BP Diastolic  80 mmHg  

 

 BMI (Body Mass Index)  31.6 kg/m2  

 

 Weight  170.00 lb  

 

 Height  61.5 inches  5'1.50"









 2018  1:27pm  Body Temperature  98.2 F  









 Heart Rate  64 /min  

 

 BP Systolic  125 mmHg  

 

 BP Diastolic  75 mmHg  

 

 BMI (Body Mass Index)  31.0 kg/m2  

 

 Weight  167.00 lb  

 

 Height  61.5 inches  5'1.50"









 2018 12:32pm  Heart Rate  64 /min  









 BP Systolic  140 mmHg  

 

 BP Diastolic  60 mmHg  

 

 BP Systolic Recheck  140 mmHg  

 

 BP Diastolic Recheck  60 mmHg  









 10/02/2017  1:50pm  Heart Rate  66 /min  









 BP Systolic  122 mmHg  

 

 BP Diastolic  60 mmHg  

 

 Weight  164.00 lb  









 2017 10:20am  Heart Rate  64 /min  









 BP Systolic  120 mmHg  Supine

 

 BP Diastolic  75 mmHg  Supine

 

 BP Systolic Recheck  110 mmHg  Standing

 

 BP Diastolic Recheck  70 mmHg  Standing

 

 BMI (Body Mass Index)  31.2 kg/m2  

 

 Weight  168.00 lb  

 

 Height  61.5 inches  5'1.50"









 2017 12:46pm  Heart Rate  72 /min  









 BP Systolic  115 mmHg  

 

 BP Diastolic  70 mmHg  









 2016  1:21pm  Body Temperature  99.9 F  









 O2 % BldC Oximetry  98 %  

 

 Heart Rate  76 /min  

 

 BP Systolic  110 mmHg  

 

 BP Diastolic  70 mmHg  









 2016 12:43pm  Body Temperature  98.4 F  









 O2 % BldC Oximetry  98 %  

 

 Heart Rate  68 /min  









 2016 10:04am  O2 % BldC Oximetry  98 %  









 Heart Rate  70 /min  

 

 BP Systolic  105 mmHg  

 

 BP Diastolic  65 mmHg  

 

 BMI (Body Mass Index)  30.2 kg/m2  

 

 Weight  164.00 lb  

 

 Height  61.75 inches  5'1.75"









 2016 10:09am  Heart Rate  64 /min  88 Standing









 BP Systolic  110 mmHg  Supine

 

 BP Diastolic  70 mmHg  Supine

 

 BP Systolic Recheck  100 mmHg  Standing

 

 BP Diastolic Recheck  60 mmHg  Standing









 2016  4:21pm  Body Temperature  98.8 F  









 O2 % BldC Oximetry  98 %  

 

 Heart Rate  81 /min  

 

 Weight  164.00 lb  









 02/15/2016 11:37am  Heart Rate  64 /min  









 BP Systolic  125 mmHg  

 

 BP Diastolic  80 mmHg  









 10/22/2015 11:33am  Heart Rate  64 /min  









 BP Systolic  122 mmHg  

 

 BP Diastolic  70 mmHg  









 2015  3:29pm  Body Temperature  98.7 F  









 O2 % BldC Oximetry  96 %  

 

 Heart Rate  81 /min  

 

 BP Systolic  130 mmHg  

 

 BP Diastolic  70 mmHg  

 

 BMI (Body Mass Index)  32.7 kg/m2  

 

 Weight  176.00 lb  

 

 Height  61.5 inches  5'1.50"









 2015 10:47am  Ejection Fraction  65%  ECHO







Results







 Test  Date  Facility  Test  Result  H/L  Range  Note

 

 Urinalysis Profile  2019  [facility]  Urine Color  Yellow    
  



     [place] [address]          



     [phone]          









 Urine Appearance  Clear      

 

 Urine Specific Gravity  1.013  N  1.010-1.030  

 

 Urine pH  5.0  N  5-9  

 

 Urine Urobilinogen  Negative    Negative  

 

 Urine Ketones  Negative    Negative  

 

 Urine Protein  Negative    Negative  

 

 Urine Leukocytes  2+  Abnormal  Negative  

 

 Urine Blood  Negative    Negative  

 

 Urine Nitrite  Negative    Negative  

 

 Urine Bilirubin  Negative    Negative  

 

 Urine Glucose  Negative    Negative  

 

 Urine White Blood Cell  2+(11-20/hpf)  Abnormal  Absent  

 

 Urine Red Blood Cell  Trace(0-2/hpf)    Absent  

 

 Urine Bacteria  1+  Abnormal  Absent  









 Inr/Protime  2019  [facility]  Inr  1.04  N  0.82-1.09  1



     [place] [address]          



     [phone]          

 

 Laboratory test  2019  [facility]  Partial  37.8 seconds  N  
26.0-38.0  



 [place] [address]  Thrombo Time        



     [phone]  PTT        

 

 CBC Auto Diff  2019  [facility]  White Blood  6.2 10^3/uL  N  
3.5-10.8  



     [place] [address]  Count        



     [phone]          









 Red Blood Count  4.19 10^6/uL  N  3.70-4.87  

 

 Hemoglobin  12.7 g/dL  N  12.0-16.0  

 

 Hematocrit  38 %  N  35-47  

 

 Mean Corpuscular Volume  92 fL  N  80-97  

 

 Mean Corpuscular Hemoglobin  30 pg  N  27-31  

 

 Mean Corpuscular HGB Conc  33 g/dL  N  31-36  

 

 Red Cell Distribution Width  14 %  N  10-15  

 

 Platelet Count  261 10^3/uL  N  150-450  

 

 Mean Platelet Volume  7.8 fL  N  7.4-10.4  

 

 Abs Neutrophils  3.1 10^3/uL  N  1.5-7.7  

 

 Abs Lymphocytes  2.3 10^3/uL  N  1.0-4.8  

 

 Abs Monocytes  0.6 10^3/uL  N  0-0.8  

 

 Abs Eosinophils  0.1 10^3/uL  N  0-0.6  

 

 Abs Basophils  0.1 10^3/uL  N  0-0.2  

 

 Abs Nucleated RBC  0.0 10^3/uL      

 

 Granulocyte %  50.1 %      

 

 Lymphocyte %  36.8 %      

 

 Monocyte %  9.9 %      

 

 Eosinophil %  2.2 %      

 

 Basophil %  1.0 %      

 

 Nucleated Red Blood Cells %  0.0      









 Type & Screen  2019  [facility]  Patient Blood Type  AB 
Positive      



     [place] [address]          



     [phone]          









 Antibody Screen  NEGATIVE      









 Comp Metabolic Panel  2019  [facility]  Sodium  137 mmol/L  N
  135-145  



     [place] [address]          



     [phone]          









 Potassium  3.8 mmol/L  N  3.5-5.0  

 

 Chloride  100 mmol/L  Low  101-111  

 

 Co2 Carbon Dioxide  29 mmol/L  N  22-32  

 

 Anion Gap  8 mmol/L  N  2-11  

 

 Glucose  94 mg/dL  N    

 

 Blood Urea Nitrogen  18 mg/dL  N  6-24  

 

 Creatinine  0.91 mg/dL  N  0.51-0.95  

 

 BUN/Creatinine Ratio  19.8  N  8-20  

 

 Calcium  9.6 mg/dL  N  8.6-10.3  

 

 Total Protein  7.2 g/dL  N  6.4-8.9  

 

 Albumin  4.2 g/dL  N  3.2-5.2  

 

 Globulin  3.0 g/dL  N  2-4  

 

 Albumin/Globulin Ratio  1.4  N  1-3  

 

 Total Bilirubin  0.60 mg/dL  N  0.2-1.0  

 

 Alkaline Phosphatase  52 U/L  N    

 

 Alt  17 U/L  N  7-52  

 

 Ast  26 U/L  N  13-39  

 

 Egfr Non-  60.1    >60  

 

 Egfr   72.7    >60  2









 Urine Culture And  2019  [facility]  Urine Culture  SEE 
RESULT      3



 Sensitivities    [place] [address]    BELOW      



     [phone]          

 

 Laboratory test  2019  [facility]  Cytology  SEE RESULT      4



 finding    [place] [address]    BELOW      



     [phone]          

 

 Laboratory test  2019  [facility]  Surgical  SEE RESULT      5
, 6



 finding    [place] [address]  Pathology  BELOW      



     [phone]          

 

 CBC No Diff  10/27/2018  [facility]  White Blood  6.4 10^3/uL  N  
3.5-1  



     [place] [address]  Count      0.8  



     [phone]          









 Red Blood Count  4.50 10^6/uL  N  4.00-5.40  

 

 Hemoglobin  14.0 g/dL  N  12.0-16.0  

 

 Hematocrit  42 %  N  35-47  

 

 Mean Corpuscular Volume  93 fL  N  80-97  

 

 Mean Corpuscular Hemoglobin  31 pg  N  27-31  

 

 Mean Corpuscular HGB Conc  34 g/dL  N  31-36  

 

 Red Cell Distribution Width  14 %  N  10.5-15  

 

 Platelet Count  292 10^3/uL  N  150-450  

 

 Mean Platelet Volume  8.8 um3  N  7.4-10.4  









 Basic Metabolic Panel  10/27/2018  [facility]  Sodium  141 mmol/L  
N  135-145  



     [place] [address] [phone]          









 Potassium  4.7 mmol/L  N  3.5-5.0  

 

 Chloride  102 mmol/L  N  101-111  

 

 Co2 Carbon Dioxide  30 mmol/L  N  22-32  

 

 Anion Gap  9 mmol/L  N  2-11  

 

 Glucose  92 mg/dL  N    

 

 Blood Urea Nitrogen  14 mg/dL  N  6-24  

 

 Creatinine  0.85 mg/dL  N  0.51-0.95  

 

 BUN/Creatinine Ratio  16.5  N  8-20  

 

 Calcium  9.6 mg/dL  N  8.6-10.3  

 

 Egfr Non-  65.2    >60  

 

 Egfr   78.9    >60  7









 Laboratory test  10/27/2018  [facility]  Vitamin D  53.9 ng/mL    20
-100  



 [place] [address]  Total 25(Oh)        



     [phone]          

 

 Xray  2018  [facility]  Hips,  Severe OA      



     [address]  [place],        



     [place] [address]  Min. Of 2        



     [phone]  Veiw/Hip        

 

 Comp Metabolic  2018  [facility]  Sodium  142 mmol/L  N  135-
145  



 [place] [address]          



     [phone]          









 Potassium  4.1 mmol/L  N  3.5-5.0  

 

 Chloride  104 mmol/L  N  101-111  

 

 Co2 Carbon Dioxide  30 mmol/L  N  22-32  

 

 Anion Gap  8 mmol/L  N  2-11  

 

 Glucose  90 mg/dL  N    

 

 Blood Urea Nitrogen  17 mg/dL  N  6-24  

 

 Creatinine  0.83 mg/dL  N  0.51-0.95  

 

 BUN/Creatinine Ratio  20.5  High  8-20  

 

 Calcium  9.5 mg/dL  N  8.6-10.3  

 

 Total Protein  7.0 g/dL  N  6.4-8.9  

 

 Albumin  4.1 g/dL  N  3.2-5.2  

 

 Globulin  2.9 g/dL  N  2-4  

 

 Albumin/Globulin Ratio  1.4  N  1-3  

 

 Total Bilirubin  0.80 mg/dL  N  0.2-1.0  

 

 Alkaline Phosphatase  53 U/L  N    

 

 Alt  19 U/L  N  7-52  

 

 Ast  29 U/L  N  13-39  

 

 Egfr Non-  67.0    >60  

 

 Egfr   81.1    >60  8









 Lipid Profile  2018  [facility]  Triglycerides  77 mg/dL      
9



 (Trig/Chol/[place] [address]          



     [phone]          









 Cholesterol  216 mg/dL      10

 

 HDL Cholesterol  73.6 mg/dL      11

 

 LDL Cholesterol  127 mg/dL      12









 Laboratory test  2018  [facility]  Creatine  79 U/L  N  10-
223  



 [place] [address]  Kinase(CK)        



     [phone]          

 

 Lipid Profile  2018  [facility]  Triglycerides  63    <150  13
, 14



 (Trig/Chol/[place] [address]    mg/dL      



     [phone]          









 Cholesterol  253 mg/dL  High  <200  15

 

 HDL Cholesterol  74.4 mg/dL    >40  16

 

 LDL Cholesterol  166 mg/dL  High  <160  17









 CBC No Diff  2018  [facility]  White Blood  5.1 10^3/uL  N  
3.5-10.8  18



     [place] [address]  Count        



     [phone]          









 Red Blood Count  4.26 10^6/uL  N  4.0-5.4  

 

 Hemoglobin  13.2 g/dL  N  12.0-16.0  

 

 Hematocrit  40 %  N  35-47  

 

 Mean Corpuscular Volume  93 fL  N  80-97  

 

 Mean Corpuscular Hemoglobin  31 pg  N  27-31  

 

 Mean Corpuscular HGB Conc  33 g/dL  N  31-36  

 

 Red Cell Distribution Width  13 %  N  10.5-15  

 

 Platelet Count  268 10^3/uL  N  150-450  

 

 Mean Platelet Volume  8 um3  N  7.4-10.4  









 Comp Metabolic Panel  2018  [facility]  Sodium  138 mmol/L  N
  133-145  



     [place] [address]          



     [phone]          









 Potassium  4.0 mmol/L  N  3.5-5.0  

 

 Chloride  102 mmol/L  N  101-111  

 

 Co2 Carbon Dioxide  30 mmol/L  N  22-32  

 

 Anion Gap  6 mmol/L  N  2-11  

 

 Glucose  89 mg/dL  N    

 

 Blood Urea Nitrogen  15 mg/dL  N  6-24  

 

 Creatinine  0.86 mg/dL  N  0.51-0.95  

 

 BUN/Creatinine Ratio  17.4  N  8-20  

 

 Calcium  9.6 mg/dL  N  8.6-10.3  

 

 Total Protein  7.1 g/dL  N  6.4-8.9  

 

 Albumin  4.1 g/dL  N  3.2-5.2  

 

 Globulin  3.0 g/dL  N  2-4  

 

 Albumin/Globulin Ratio  1.4  N  1-3  

 

 Total Bilirubin  0.70 mg/dL  N  0.2-1.0  

 

 Alkaline Phosphatase  50 U/L  N    

 

 Alt  21 U/L  N  7-52  

 

 Ast  28 U/L  N  13-39  

 

 Egfr Non-  64.3    >60  

 

 Egfr   82.7    >60  19









 Laboratory test  2018  [facility]  Vitamin D Total  55.0  
High  20-50  20



 [place] [address]  25(Oh)  ng/mL      



     [phone]          

 

 Laboratory test  2018  [facility]  CRP High  3.93 mg/L  High  
1-3  21, 22



 [place] [address]  Sensitivity        



     [phone]          

 

 Laboratory test  06/15/2017  [facility]  Folic Acid  > 20.00  N  >
3.99  



 [place] [address]  (Folate)  ng/mL      



     [phone]          









 Vitamin B12  653 pg/mL  N  180-914  23

 

 Vitamin D 1,25-Dihydroxy  51 pg/mL  N  18-78  24









 Comp Metabolic Panel  06/15/2017  [facility]  Sodium  138 mmol/L  N
  133-145  



     [place] [address]          



     [phone]          









 Potassium  4.0 mmol/L  N  3.5-5.0  

 

 Chloride  99 mmol/L  Low  101-111  

 

 Co2 Carbon Dioxide  24 mmol/L  N  22-32  

 

 Anion Gap  15 mmol/L  High  2-11  

 

 Glucose  100 mg/dL  N    

 

 Blood Urea Nitrogen  21 mg/dL  N  6-24  

 

 Creatinine  0.84 mg/dL  N  0.51-0.95  

 

 BUN/Creatinine Ratio  25.0  High  8-20  

 

 Calcium  9.4 mg/dL  N  8.6-10.3  

 

 Total Protein  7.2 g/dL  N  6.4-8.9  

 

 Albumin  4.0 g/dL  N  3.2-5.2  

 

 Globulin  3.2 g/dL  N  2-4  

 

 Albumin/Globulin Ratio  1.3  N  1-3  

 

 Total Bilirubin  0.90 mg/dL  N  0.2-1.0  

 

 Alkaline Phosphatase  48 U/L  N    

 

 Alt  15 U/L  N  7-52  

 

 Ast  26 U/L  N  13-39  

 

 Egfr Non-  66.3  N  >60  

 

 Egfr   85.2  N  >60  25









 Lipid Profile  06/15/2017  [facility]  Triglycerides  62 mg/dL  N  <
150  26



 (Trig/[place] [address] [phone]          









 Cholesterol  262 mg/dL  High  <200  27

 

 HDL Cholesterol  70.9 mg/dL  N  >40  28

 

 LDL Cholesterol  179 mg/dL  High  <160  29









 Laboratory test  06/15/2017  [facility]  Magnesium  1.9 mg/dL  N  
1.9-2.7  



 [place] [address] [phone]          









 TSH (Thyroid Stim Horm)  2.01 mcIU/mL  N  0.34-5.60  

 

 Creatine Kinase(CK)  80 U/L  N    









 Basic Metabolic Panel  2017  [facility]  Sodium  138 mmol/L  
N  133-145  



     [place] [address] [phone]          









 Potassium  4.3 mmol/L  N  3.5-5.0  

 

 Chloride  100 mmol/L  Low  101-111  

 

 Co2 Carbon Dioxide  32 mmol/L  N  22-32  

 

 Anion Gap  6 mmol/L  N  2-11  

 

 Glucose  93 mg/dL  N    

 

 Blood Urea Nitrogen  18 mg/dL  N  6-24  

 

 Creatinine  0.81 mg/dL  N  0.51-0.95  

 

 BUN/Creatinine Ratio  22.2  High  8-20  

 

 Calcium  9.4 mg/dL  N  8.6-10.3  

 

 Egfr Non-  69.1  N  >60  

 

 Egfr   88.9  N  >60  30









 Lipid Profile  2017  [facility]  Triglycerides  81 mg/dL  N  <
150  31



 (Trig/[place] [address] [phone]          









 Cholesterol  251 mg/dL  High  <200  32

 

 HDL Cholesterol  65.1 mg/dL  N  >40  33

 

 LDL Cholesterol  170 mg/dL  High  <130  34









 Xray  2016  [facility]  Dexa Scan,  Osteopenia      



     PASCUAL Roche (e[place]        



     [place] [address]  hips, pelvis,        



     [phone]  spine)        

 

 CBC No Diff  2016  [facility]  White Blood  6.2 10^3/uL  N  
3.5-10.8  



     [place] [address]  Count        



     [phone]          









 Red Blood Count  4.20 10^6/uL  N  4.0-5.4  

 

 Hemoglobin  13.0 g/dL  N  12.0-16.0  

 

 Hematocrit  38 %  N  35-47  

 

 Mean Corpuscular Volume  91 fL  N  80-97  

 

 Mean Corpuscular Hemoglobin  31 pg  N  27-31  

 

 Mean Corpuscular HGB Conc  34 g/dL  N  31-36  

 

 Red Cell Distribution Width  14 %  N  10.5-15  

 

 Platelet Count  272 10^3/uL  N  150-450  

 

 Mean Platelet Volume  8 um3  N  7.4-10.4  









 Lipid Profile  2016  [facility]  Triglycerides  67 mg/dL  N  <
150  35



 (Trig/Chol/HDL)    [place], [place] [id number] [phone]          









 Cholesterol  242 mg/dL  High  <200  36

 

 HDL Cholesterol  71.0 mg/dL  N  >35  37

 

 LDL Cholesterol  158 mg/dL  N  <160  38









 Laboratory test  2016  [facility]  Vitamin B12  885 pg/mL  N  
180-914  39



 finding    [place], [place] [id number] [phone]          









 Vitamin D Total 25(Oh)  52.6 ng/mL  High  30-50  









 Comp Metabolic Panel  2016  [facility]  Sodium  138 mmol/L  N
  133-145  



     [place], [place] [id number] [phone]          









 Potassium  4.4 mmol/L  N  3.5-5.0  

 

 Chloride  101 mmol/L  N  101-111  

 

 Co2 Carbon Dioxide  30 mmol/L  N  22-32  

 

 Anion Gap  7 mmol/L  N  2-11  

 

 Glucose  92 mg/dL  N    

 

 Blood Urea Nitrogen  18 mg/dL  N  6-24  

 

 Creatinine  0.78 mg/dL  N  0.51-0.95  

 

 BUN/Creatinine Ratio  23.1  High  8-20  

 

 Calcium  9.2 mg/dL  N  8.6-10.3  

 

 Total Protein  7.2 g/dL  N  6.4-8.9  

 

 Albumin  4.0 g/dL  N  3.2-5.2  

 

 Globulin  3.2 g/dL  N  2-4  

 

 Albumin/Globulin Ratio  1.3  N  1-3  

 

 Total Bilirubin  0.70 mg/dL  N  0.2-1.0  

 

 Alkaline Phosphatase  50 U/L  N    

 

 Alt  27 U/L  N  7-52  

 

 Ast  35 U/L  N  13-39  

 

 Egfr Non-  72.4  N  >60  

 

 Egfr   93.1  N  >60  40









 Lipid Profile  2016  [facility]  Triglycerides  62 mg/dL  N  <
150  41



 (Trig/Chol/HDL)    [place], [place] [id number] [phone]          









 Cholesterol  214 mg/dL  High  <200  42

 

 HDL Cholesterol  65.7 mg/dL  N  >35  43

 

 LDL Cholesterol  136 mg/dL  High  <130  44









 Laboratory test  2016  [facility]  Vitamin D  39.3 ng/mL  N  
30-50  



 [place] [address]  Total 25(Oh)        



     [phone]          

 

 Basic Metabolic  2016  [facility]  Sodium  140 mmol/L  N  133-
145  



 Panel    [place], [place] [id number] [phone]          









 Potassium  4.0 mmol/L  N  3.5-5.0  

 

 Chloride  104 mmol/L  N  101-111  

 

 Co2 Carbon Dioxide  28 mmol/L  N  22-32  

 

 Anion Gap  8 mmol/L  N  2-11  

 

 Glucose  96 mg/dL  N    

 

 Blood Urea Nitrogen  16 mg/dL  N  6-24  

 

 Creatinine  0.85 mg/dL  N  0.51-0.95  

 

 BUN/Creatinine Ratio  18.8  N  8-20  

 

 Calcium  8.7 mg/dL  N  8.6-10.3  

 

 Egfr Non-  65.6  N  >60  

 

 Egfr   84.3  N  >60  45









 Basic Metabolic Panel  2015  [facility]  Sodium  136 mmol/L  
N  133-145  



     [place] [address] [phone]          









 Potassium  3.7 mmol/L  N  3.5-5.0  

 

 Chloride  99 mmol/L  Low  101-111  

 

 Co2 Carbon Dioxide  30 mmol/L  N  22-32  

 

 Anion Gap  7 mmol/L  N  2-11  

 

 Glucose  91 mg/dL  N    

 

 Blood Urea Nitrogen  14 mg/dL  N  6-24  

 

 Creatinine  0.89 mg/dL  N  0.51-0.95  

 

 BUN/Creatinine Ratio  15.7  N  8-20  

 

 Calcium  9.2 mg/dL  N  8.6-10.3  

 

 Egfr Non-  62.3  N  >60  

 

 Egfr   80.2  N  >60  46









 Basic Metabolic  10/19/2015  [facility]  Sodium  132 mmol/L  Low  
133-145  



 Panel    [place], [place] [id number] [phone]          









 Potassium  3.5 mmol/L  N  3.5-5.0  

 

 Chloride  97 mmol/L  Low  101-111  

 

 Co2 Carbon Dioxide  29 mmol/L  N  22-32  

 

 Anion Gap  6 mmol/L  N  2-11  

 

 Glucose  98 mg/dL  N    

 

 Blood Urea Nitrogen  15 mg/dL  N  6-24  

 

 Creatinine  0.83 mg/dL  N  0.51-0.95  

 

 BUN/Creatinine Ratio  18.1  N  8-20  

 

 Calcium  9.4 mg/dL  N  8.6-10.3  

 

 Egfr Non-  67.6  N  >60  

 

 Egfr   86.9  N  >60  47









 CBC No Diff  2015  [facility]  White Blood  5.8 10^3/uL  N  
4.8-10.8  48



     [place] [address]  Count        



     [phone]          









 Red Blood Count  4.49 10^6/uL  N  4.0-5.4  

 

 Hemoglobin  14.0 g/dL  N  12.0-16.0  

 

 Hematocrit  42 %  N  35-47  

 

 Mean Corpuscular Volume  94 fL  N  80-97  

 

 Mean Corpuscular Hemoglobin  31 pg  N  27-31  

 

 Mean Corpuscular HGB Conc  33 g/dL  N  31-36  

 

 Red Cell Distribution Width  14 %  N  10.5-15  

 

 Platelet Count  278 10^3/uL  N  150-450  

 

 Mean Platelet Volume  8 um3  N  7.4-10.4  









 Comp Metabolic Panel  2015  [facility]  Sodium  137 mmol/L  N
  133-145  



     [place], [place] [id number] [phone]          









 Potassium  4.1 mmol/L  N  3.5-5.0  

 

 Chloride  101 mmol/L  N  101-111  

 

 Co2 Carbon Dioxide  28 mmol/L  N  22-32  

 

 Anion Gap  8 mmol/L  N  2-11  

 

 Glucose  84 mg/dL  N    

 

 Blood Urea Nitrogen  22 mg/dL  N  6-24  

 

 Creatinine  0.87 mg/dL  N  0.51-0.95  

 

 BUN/Creatinine Ratio  25.3  High  8-20  

 

 Calcium  9.4 mg/dL  N  8.6-10.3  

 

 Total Protein  7.1 g/dL  N  6.4-8.9  

 

 Albumin  4.1 g/dL  N  3.2-5.2  

 

 Globulin  3.0 g/dL  N  2-4  

 

 Albumin/Globulin Ratio  1.4  N  1-3  

 

 Total Bilirubin  0.80 mg/dL  N  0.2-1.0  

 

 Alkaline Phosphatase  47 U/L  N    

 

 Alt  26 U/L  N  7-52  

 

 Ast  37 U/L  N  13-39  

 

 Egfr Non-  64.0  N  >60  

 

 Egfr   82.3  N  >60  49









 Laboratory test  2015  [facility]  TSH (Thyroid  2.15 ?IU/mL  
N  0.34-5.60  50



 [place] [address]  Stim Horm)        



     [phone]          









 Vitamin D Total 25(Oh)  24.1 ng/mL  Low  30-50  51

 

 Vitamin B12  930 pg/mL  High  180-914  52









 Lipid Profile  2015  [facility]  Triglycerides  57 mg/dL  N  
  53



 (Trig/Chol/HDL)    [place], [place] [id number] [phone]          









 Cholesterol  170 mg/dL  N    54

 

 HDL Cholesterol  66.3 mg/dL  N    55

 

 LDL Cholesterol  92 mg/dL  N    56









 Urine Microalbumin  2015  [facility]  Ur Microalbumin  17.0 mg
/L  N    



 [place] [address]  (mg/L)        



     [phone]          









 Urine Creatinine  146.92 mg/dL  N    

 

 Urine Microalbumin/Creatinine  11.5 ug/mg  N  <31  









 1  Standard intensity warfarin therapeutic range: 2.0-3.0



   High intensity warfarin therapeutic range: 2.5-3.5

 

 2  *******Because ethnic data is not always readily available,



   this report includes an eGFR for both -Americans and



   non- Americans.****



   The National Kidney Disease Education Program (NKDEP) does



   not endorse the use of the MDRD equation for patients that



   are not between the ages of 18 and 70, are pregnant, have



   extremes of body size, muscle mass, or nutritional status,



   or are non- or non-.



   According to the National Kidney Foundation, irrespective of



   diagnosis, the stage of the disease is based on the level of



   kidney function:



   Stage Description                      GFR(mL/min/1.73 m(2))



   1     Kidney damage with normal or decreased GFR       90



   2     Kidney damage with mild decrease in GFR          60-89



   3     Moderate decrease in GFR                         30-59



   4     Severe decrease in GFR                           15-29



   5     Kidney failure                       <15 (or dialysis)

 

 3  SEE RESULT BELOW



   -----------------------------------------------------------------------------
---------------



   Name:  JERZY BARILLAS                : 1945    Attend Dr: Shabana Vargas MD



   Acct:  [id number]  Unit: [id number]  AGE: 74            Location:  [facility]



   Re19                        SEX: F             Status:    REG REF



   -----------------------------------------------------------------------------
---------------



   



   SPEC: [id number]         TIFFANIE:       [facility] DR: Shabana Vargas MD



   REQ:  [id number]              RECD:   



   STATUS: REKHA PELAYO DR: Billy Velasquez MD



   _



   SOURCE: URINE          SPDESC:



   ORDERED:  Urine Culture



   QUERIES:  Urine Source: Clean Catch



   



   -----------------------------------------------------------------------------
---------------



   Procedure                         Result                         Reported   
        Site



   -----------------------------------------------------------------------------
---------------



   Urine Culture  Final                                             19-
[id number]      ML



   



   Mixed upon extended incubation. Suggest resubmission.



   



   -----------------------------------------------------------------------------
---------------



   * [facility] - Main Lab



   .



   



   



   



   



   



   



   



   



   



   



   



   



   



   



   



   



   



   



   



   



   



   



   



   



   



   ** END OF REPORT **



   



   DEPARTMENT OF PATHOLOGY,  [address] [place] [address]



   Phone # [phone]      Fax #[phone]



   Tyrese Fairbanks M.D. Director     [facility] # [id number]

 

 4  SEE RESULT BELOW



   -----------------------------------------------------------------------------
---------------



   Name:  JERZY BARILLAS                : 1945    Attend Dr: 
Lorena Washington MD



   Acct:  [id number]  Unit: [id number]  AGE: 74            Location:  [facility]



   Re19                        SEX: F             Status:    REG REF



   -----------------------------------------------------------------------------
---------------



   



   SPEC: [id number]            TIFFANIE:       [facility] DR: Lorena Washington MD



   REQ:  [id number]             RECD: 



   STATUS: DENA PELAYO DR: Billy Velasquez MD



   _



   ORDERED:  TP IMAGE ANALYS, HPV/Thin Prep



   COMMENTS: [id number]



   Negative for Intraepithelial lesion or Malignancy



   



   -----------------------------------------------------------------------------
---------------



   Date     Time Test              Result   Flag (u) Normal Range



   19   HPV RNA         Negative          Negative



   



   The high-risk HPV types detected by the assay include: 16,



   18, 31, 33, 35, 39, 45, 51, 52, 56, 58, 59, 66, and 68.



   -----------------------------------------------------------------------------
---------------



   



   A. Ectocervical/Endocervical



   Specimen Adequacy:



   Satisfactory of evaluation



   Transformation zone component not identified



   Patient Information:



   HPV: High risk HPV RNA testing regardless of pap results.



   Actual Specimen Date:         19



   LMP If Unknown:               



   Date of Last Specimen:        17



   



   Signed by and Reported on: __________              LISET Arango(ASCP)  [id number]



   



   This Pap test was evaluated with the assistance of the Shoulder Tapp Test Imaging 
System. Due to



   cytologic findings at the imager microscope, comprehensive manual 
rescreening by a



   Cytotechnologist may be required.



   The Pap Smear is a screening test designed to aid in the detection of 
premalignant and



   malignant conditions of the uterine cervix.  It is not a diagnostic 
procedure and should



   not be used as the sole means of detecting cervical cancer.  Both false-
positive and false-



   negative reports do occur.  Depending on your risk status, a Pap smear 
should be obtained



   and evaluated every 1-3 years.



   



   -----------------------------------------------------------------------------
---------------



   



   



   



   



   



   ** END OF REPORT **



   



   DEPARTMENT OF PATHOLOGY,  [address] [place] [address]



   Phone # [phone]      Fax #[phone]



   Tyrese Fairbanks M.D. Director     [facility] # [id number]

 

 5  [id number]

 

 6  SEE RESULT BELOW



   -----------------------------------------------------------------------------
---------------



   Name:  JERZY BARILLAS                : 1945    Attend Dr: Rich Lamb MD



   Acct:  [id number]  Unit: [id number]  AGE: 74            Location:  [facility]



   Re19                        SEX: F             Status:    DEP REF



   -----------------------------------------------------------------------------
---------------



   



   SPEC: [id number]             TIFFANIE:       [facility] DR: Rich Lamb MD



   REQ:  [id number]             RECD: 



   STATUS: DENA PELAYO DR: Billy Velasquez MD



   _



   ORDERED:  LEVEL 4/3



   COMMENTS: [id number]



   



   FINAL DIAGNOSIS



   



   



   1.   Stomach, body, biopsy:



   -- Body-type gastric mucosa with mild chronic gastritis.



   -- No evidence of Helicobacter organisms.



   



   2.   Stomach, biopsy:



   -- Fundic gland polyp.



   



   3.   Colon, at 30 cm, biopsy:



   -- Tubular adenoma.



   -- No high grade dysplasia or malignancy.



   



   



   -----------------------------------------------------------------------------
---------------



   



   



   



   CLINICAL HISTORY



   



   Distention



   



   



   POST-OPERATIVE DIAGNOSIS



   



   EGD: esophagus - tortuous; gastric - polyp - biopsy; body biopsy; 
colonoscopy - 30 cm polyp;



   to terminal ileum; normal



   



   



   GROSS DESCRIPTION



   



   1.   The specimen is received in formalin labeled, Gastric Body Biopsy, and 
consists of a



   0.5 x 0.3 x 0.1 cm tan-pink irregular soft tissue fragment which is 
submitted entirely in



   one cassette.



   



   



   ** CONTINUED ON NEXT PAGE **



   



   DEPARTMENT OF PATHOLOGY,  [address] [place] [address]



   Phone # [phone]      Fax #[phone]



   Tyrese Fairbanks M.D. Director     [facility] # [id number]



   



   



   



   RUN DATE: 04/10/19               [facility] LAB **LIVE**         
       PAGE    2



   



   -----------------------------------------------------------------------------
---------------



   Patient: JERZY BARILLAS                 [id number]     (Continued)



   -----------------------------------------------------------------------------
---------------



   



   GROSS DESCRIPTION          (Continued)



   



   2.   The specimen is received in formalin labeled, Biopsy Gastric Polyp, and 
consists of a



   0.4 x 0.3 x 0.3 cm tan-pink polypoid soft tissue fragment which is submitted 
entirely in one



   cassette.



   



   3.   The specimen is received in formalin labeled, Colon Polyp at 30 cm, and 
consists of two



   tan-white irregular to polypoid soft tissue fragments measuring 0.3 x 0.2 x 
0.1 cm and 0.4 x



   0.2 x 0.2 cm which are submitted entirely in one cassette.



   



   Signed by and Reported on: __________              Christen Fraga MD 
04/10/19 1549



   



   -----------------------------------------------------------------------------
---------------



   



   



   



   



   



   



   



   



   



   



   



   



   



   



   



   



   



   



   



   



   



   



   



   



   



   



   



   



   



   



   



   



   ** END OF REPORT **



   



   DEPARTMENT OF PATHOLOGY,  [address] [place] [address]



   Phone # [phone]      Fax #[phone]



   Tyrese Fairbanks M.D. Director     [facility] # [id number]

 

 7  *******Because ethnic data is not always readily available,



   this report includes an eGFR for both -Americans and



   non- Americans.****



   The National Kidney Disease Education Program (NKDEP) does



   not endorse the use of the MDRD equation for patients that



   are not between the ages of 18 and 70, are pregnant, have



   extremes of body size, muscle mass, or nutritional status,



   or are non- or non-.



   According to the National Kidney Foundation, irrespective of



   diagnosis, the stage of the disease is based on the level of



   kidney function:



   Stage Description                      GFR(mL/min/1.73 m(2))



   1     Kidney damage with normal or decreased GFR       90



   2     Kidney damage with mild decrease in GFR          60-89



   3     Moderate decrease in GFR                         30-59



   4     Severe decrease in GFR                           15-29



   5     Kidney failure                       <15 (or dialysis)

 

 8  *******Because ethnic data is not always readily available,



   this report includes an eGFR for both -Americans and



   non- Americans.****



   The National Kidney Disease Education Program (NKDEP) does



   not endorse the use of the MDRD equation for patients that



   are not between the ages of 18 and 70, are pregnant, have



   extremes of body size, muscle mass, or nutritional status,



   or are non- or non-.



   According to the National Kidney Foundation, irrespective of



   diagnosis, the stage of the disease is based on the level of



   kidney function:



   Stage Description                      GFR(mL/min/1.73 m(2))



   1     Kidney damage with normal or decreased GFR       90



   2     Kidney damage with mild decrease in GFR          60-89



   3     Moderate decrease in GFR                         30-59



   4     Severe decrease in GFR                           15-29



   5     Kidney failure                       <15 (or dialysis)

 

 9  Desirable: <150



   Borderline High: 150-199



   High: 200-499



   Very High: >500

 

 10  Desirable: <200



   Borderline High: 200-239



   High: >239

 

 11  Low: <40



   Desirable: 40-60



   High: >60

 

 12  Desirable: <100



   Near Optimal: 100-129



   Borderline High: 130-159



   High: 160-189



   Very High: >189

 

 13  FASTING

 

 14  Desirable: <150



   Borderline High: 150-199



   High: 200-499



   Very High: >500

 

 15  Desirable: <200



   Borderline High: 200-239



   High: >239

 

 16  Low: <40



   Desirable: 40-60



   High: >60

 

 17  Desirable: <100



   Near Optimal: 100-129



   Borderline High: 130-159



   High: 160-189



   Very High: >189

 

 18  System Event: N  Abn NRBC Pattern  Low Events: N FASTING

 

 19  *******Because ethnic data is not always readily available,



   this report includes an eGFR for both -Americans and



   non- Americans.****



   The National Kidney Disease Education Program (NKDEP) does



   not endorse the use of the MDRD equation for patients that



   are not between the ages of 18 and 70, are pregnant, have



   extremes of body size, muscle mass, or nutritional status,



   or are non- or non-.



   According to the National Kidney Foundation, irrespective of



   diagnosis, the stage of the disease is based on the level of



   kidney function:



   Stage Description                      GFR(mL/min/1.73 m(2))



   1     Kidney damage with normal or decreased GFR       90



   2     Kidney damage with mild decrease in GFR          60-89



   3     Moderate decrease in GFR                         30-59



   4     Severe decrease in GFR                           15-29



   5     Kidney failure                       <15 (or dialysis)

 

 20  FASTING

 

 21  FASTING

 

 22  Low risk: <1.00



   Average risk: 1.00-3.00



   High risk: >3.00

 

 23  Normal Range 180 to 914



   Indeterminate Range 145 to 180



   Deficient Range  <145

 

 24  -------------------ADDITIONAL INFORMATION-------------------



   This test was developed and its performance characteristics



   determined by [facility] in a manner consistent with CLIA



   requirements. This test has not been cleared or approved by



   the U.S. Food and Drug Administration.



   Test Performed by:



   [place] [id number]

 

 25  *******Because ethnic data is not always readily available,



   this report includes an eGFR for both -Americans and



   non- Americans.****



   The National Kidney Disease Education Program (NKDEP) does



   not endorse the use of the MDRD equation for patients that



   are not between the ages of 18 and 70, are pregnant, have



   extremes of body size, muscle mass, or nutritional status,



   or are non- or non-.



   According to the National Kidney Foundation, irrespective of



   diagnosis, the stage of the disease is based on the level of



   kidney function:



   Stage Description                      GFR(mL/min/1.73 m(2))



   1     Kidney damage with normal or decreased GFR       90



   2     Kidney damage with mild decrease in GFR          60-89



   3     Moderate decrease in GFR                         30-59



   4     Severe decrease in GFR                           15-29



   5     Kidney failure                       <15 (or dialysis)

 

 26  Desirable <150



   Borderline high 150-199



   High 200-499



   Very High >500

 

 27  Desirable <200



   Borderline high 200-239



   High >239

 

 28  Low <40



   Desirable: 40-60



   High: >60

 

 29  Desirable: <100 mg/dL



   Near Optimal: 100-129 mg/dL



   Borderline High: 130-159 mg/dL



   High: 160-189 mg/dL



   Very High: >189 mg/dL

 

 30  *******Because ethnic data is not always readily available,



   this report includes an eGFR for both -Americans and



   non- Americans.****



   The National Kidney Disease Education Program (NKDEP) does



   not endorse the use of the MDRD equation for patients that



   are not between the ages of 18 and 70, are pregnant, have



   extremes of body size, muscle mass, or nutritional status,



   or are non- or non-.



   According to the National Kidney Foundation, irrespective of



   diagnosis, the stage of the disease is based on the level of



   kidney function:



   Stage Description                      GFR(mL/min/1.73 m(2))



   1     Kidney damage with normal or decreased GFR       90



   2     Kidney damage with mild decrease in GFR          60-89



   3     Moderate decrease in GFR                         30-59



   4     Severe decrease in GFR                           15-29



   5     Kidney failure                       <15 (or dialysis)

 

 31  Desirable <150



   Borderline high 150-199



   High 200-499



   Very High >500

 

 32  Desirable <200



   Borderline high 200-239



   High >239

 

 33  Low <40



   Desirable: 40-60



   High: >60

 

 34  Desirable: <100 mg/dL



   Near Optimal: 100-129 mg/dL



   Borderline High: 130-159 mg/dL



   High: 160-189 mg/dL



   Very High: >189 mg/dL

 

 35  Desirable <150



   Borderline high 150-199



   High 200-499



   Very High >500

 

 36  Desirable <200



   Borderline high 200-239



   High >239

 

 37  Low <40



   Desirable: 40-60



   High: >60

 

 38  Desirable: <100 mg/dL



   Near Optimal: 100-129 mg/dL



   Borderline High: 130-159 mg/dL



   High: 160-189 mg/dL



   Very High: >189 mg/dL

 

 39  Normal Range 180 to 914



   Indeterminate Range 145 to 180



   Deficient Range  <145

 

 40  *******Because ethnic data is not always readily available,



   this report includes an eGFR for both -Americans and



   non- Americans.****



   The National Kidney Disease Education Program (NKDEP) does



   not endorse the use of the MDRD equation for patients that



   are not between the ages of 18 and 70, are pregnant, have



   extremes of body size, muscle mass, or nutritional status,



   or are non- or non-.



   According to the National Kidney Foundation, irrespective of



   diagnosis, the stage of the disease is based on the level of



   kidney function:



   Stage Description                      GFR(mL/min/1.73 m(2))



   1     Kidney damage with normal or decreased GFR       90



   2     Kidney damage with mild decrease in GFR          60-89



   3     Moderate decrease in GFR                         30-59



   4     Severe decrease in GFR                           15-29



   5     Kidney failure                       <15 (or dialysis)

 

 41  Desirable <150



   Borderline high 150-199



   High 200-499



   Very High >500

 

 42  Desirable <200



   Borderline high 200-239



   High >239

 

 43  Low <40



   Desirable: 40-60



   High: >60

 

 44  Desirable: <100 mg/dL



   Near Optimal: 100-129 mg/dL



   Borderline High: 130-159 mg/dL



   High: 160-189 mg/dL



   Very High: >189 mg/dL

 

 45  *******Because ethnic data is not always readily available,



   this report includes an eGFR for both -Americans and



   non- Americans.****



   The National Kidney Disease Education Program (NKDEP) does



   not endorse the use of the MDRD equation for patients that



   are not between the ages of 18 and 70, are pregnant, have



   extremes of body size, muscle mass, or nutritional status,



   or are non- or non-.



   According to the National Kidney Foundation, irrespective of



   diagnosis, the stage of the disease is based on the level of



   kidney function:



   Stage Description                      GFR(mL/min/1.73 m(2))



   1     Kidney damage with normal or decreased GFR       90



   2     Kidney damage with mild decrease in GFR          60-89



   3     Moderate decrease in GFR                         30-59



   4     Severe decrease in GFR                           15-29



   5     Kidney failure                       <15 (or dialysis)

 

 46  *******Because ethnic data is not always readily available,



   this report includes an eGFR for both -Americans and



   non- Americans.****



   The National Kidney Disease Education Program ([employer]) does



   not endorse the use of the MDRD equation for patients that



   are not between the ages of 18 and 70, are pregnant, have



   extremes of body size, muscle mass, or nutritional status,



   or are non- or non-.



   According to the [employer], irrespective of



   diagnosis, the stage of the disease is based on the level of



   kidney function:



   Stage Description                      GFR(mL/min/1.73 m(2))



   1     Kidney damage with normal or decreased GFR       90



   2     Kidney damage with mild decrease in GFR          60-89



   3     Moderate decrease in GFR                         30-59



   4     Severe decrease in GFR                           15-29



   5     Kidney failure                       <15 (or dialysis)

 

 47  *******Because ethnic data is not always readily available,



   this report includes an eGFR for both -Americans and



   non- Americans.****



   The National [employer] ([employer]) does



   not endorse the use of the MDRD equation for patients that



   are not between the ages of 18 and 70, are pregnant, have



   extremes of body size, muscle mass, or nutritional status,



   or are non- or non-.



   According to the [employer], irrespective of



   diagnosis, the stage of the disease is based on the level of



   kidney function:



   Stage Description                      GFR(mL/min/1.73 m(2))



   1     Kidney damage with normal or decreased GFR       90



   2     Kidney damage with mild decrease in GFR          60-89



   3     Moderate decrease in GFR                         30-59



   4     Severe decrease in GFR                           15-29



   5     Kidney failure                       <15 (or dialysis)

 

 48  PT IS FASTING

 

 49  *******Because ethnic data is not always readily available,



   this report includes an eGFR for both -Americans and



   non- Americans.****



   The National Kid[employer] Education Program ([facility]) does



   not endorse the use of the MDRD equation for patients that



   are not between the ages of 18 and 70, are pregnant, have



   extremes of body size, muscle mass, or nutritional status,



   or are non- or non-.



   According to the [employer], irrespective of



   diagnosis, the stage of the disease is based on the level of



   kidney function:



   Stage Description                      GFR(mL/min/1.73 m(2))



   1     Kidney damage with normal or decreased GFR       90



   2     Kidney damage with mild decrease in GFR          60-89



   3     Moderate decrease in GFR                         30-59



   4     Severe decrease in GFR                           15-29



   5     Kidney failure                       <15 (or dialysis)

 

 50  PT IS FASTING

 

 51  PT IS FASTING

 

 52  Normal Range 180 to 914



   Indeterminate Range 145 to 180



   Deficient Range  <145

 

 53  Desirable <150



   Borderline high 150-199



   High 200-499



   Very High >500

 

 54  Desirable <200



   Borderline high 200-239



   High >239

 

 55  Low <40



   Desirable: 40-60



   High: >60

 

 56  Desirable: <100 mg/dL



   Near Optimal: 100-129 mg/dL



   Borderline High: 130-159 mg/dL



   High: 160-189 mg/dL



   Very High: >189 mg/dL







Procedures







 Date  Code  Description  Status

 

 2019  62733367  Mammogram  Completed

 

 2019  [id number]  Colonoscopy  Completed

 

 10/23/2017  [id number]  Bone Mineral Density Test  Completed

 

 10/23/2017  [id number]  Mammogram  Completed

 

 2016  [id number]  Bone Mineral Density Test  Completed

 

 10/02/2015  [id number]  Mammogram  Completed

 

 2013  [id number]  Bone Mineral Density Test  Completed

 

 10/01/2012  [id number]  Colonoscopy  Completed

 

 2011  [id number]  Bone Mineral Density Test  Completed

 

 2008  [id number]  Bone Mineral Density Test  Completed

 

 2005  [id number]  Bone Mineral Density Test  Completed







Encounters







 Type  Date  Location  Provider  Dx  Diagnosis

 

 Office Visit  2019  Main Office  Billy SHIELDS  Z01.818  Encounter for other



   12:40p    MD Ron    preprocedural



           examination









 I10  Essential (primary) hypertension

 

 M16.12  Unilateral primary osteoarthritis, left hip









 Office Visit  05/15/2019  9:40a  [facility] Office  Billy Velasquez MD  M54.2  
Cervicalgia









 K63.5  Polyp of colon

 

 M16.12  Unilateral primary osteoarthritis, left hip









 Office Visit  10/29/2018  1:00p  [facility] Office  Billy SHIELDS  Z00.01  Encounter for



       MD Ron    general adult



           medical exam w



           abnormal findings









 I10  Essential (primary) hypertension

 

 E55.9  Vitamin D deficiency, unspecified

 

 M16.12  Unilateral primary osteoarthritis, left hip

 

 M25.552  Pain in left hip









 Office Visit  2018 11:20a  [facility] Office  Billy SHIELDS  Z01.818  Encounter 
for yadira Velasquez MD    preprocedural



           examination









 H26.9  Unspecified cataract

 

 M25.552  Pain in left hip

 

 M25.551  Pain in right hip









 Office Visit  2018  1:20p  [facility] Office  Billy SHIELDS  Z01.818  Encounter 
for yadira Velasquez MD    preprocedural



           examination









 H26.9  Unspecified cataract









 Office Visit  2018 11:20a  [facility] Office  Billy SHIELDS  I10  Essential (
primary)



       MD Ron    hypertension









 M79.1  Myalgia

 

 E78.00  Pure hypercholesterolemia, unspecified

 

 E55.9  Vitamin D deficiency, unspecified

 

 F43.21  Adjustment disorder with depressed mood









 Office Visit  10/02/2017  1:40p  [facility]  Billy Velasquez MD  M79.1  
Myalgia









 E78.00  Pure hypercholesterolemia, unspecified

 

 I10  Essential (primary) hypertension

 

 Z23  Encounter for immunization









 Office Visit  2017 10:00a  [facility] Office  Billy SHIELDS  Z00.01  Encounter for



       MD Ron    general adult



           medical exam w



           abnormal findings









 I10  Essential (primary) hypertension

 

 E78.00  Pure hypercholesterolemia, unspecified

 

 M79.1  Myalgia

 

 E55.9  Vitamin D deficiency, unspecified









 Office Visit  2017 11:40a  [facility]  Billy SHIELDS  I10  Essential (
primary)



       MD Ron    hypertension









 E78.00  Pure hypercholesterolemia, unspecified









 Office Visit  2016  1:00p  [facility] Office  Billy SHIELDS  J20.9  Acute 
bronchitis,



       MD Ron    unspecified









 K21.9  Gastro-esophageal reflux disease without esophagitis









 Office Visit  2016 12:00p  [facility] Office  Billy SHIELDS  B34.9  Viral 
infection,



       MD Ron    unspecified

 

 Office Visit  2016 10:00a  [facility]  Billy SHIELDS  Z00.01  Encounter for



       MD Ron    general adult



           medical exam w



           abnormal findings









 I10  Essential (primary) hypertension

 

 E78.0  Pure hypercholesterolemia

 

 E55.9  Vitamin D deficiency, unspecified

 

 M15.0  Primary generalized (osteo)arthritis

 

 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









 Office Visit  2016 10:20a  [facility]  Billy SHIELDS  I10  Essential (
primary)



       MD Ron    hypertension









 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









 Office Visit  2016  4:00p  [facility] Office  Billy SHIELDS  J02.9  Acute 
pharyngitis,



       MD Ron    unspecified









 R05  Cough

 

 K21.9  Gastro-esophageal reflux disease without esophagitis









 Office Visit  02/15/2016 11:00a  [facility] Office  Billy SHIELDS  I10  Essential (
primary)



       MD Ron    hypertension









 E78.0  Pure hypercholesterolemia

 

 E55.9  Vitamin D deficiency, unspecified

 

 M54.31  Sciatica, right side

 

 K21.9  Gastro-esophageal reflux disease without esophagitis

 

 K22.8  Other specified diseases of esophagus









 Office Visit  10/22/2015 11:40a  [facility]  Billy SHIELDS  I10  Essential (
primary)



       MD Ron    hypertension

 

 Office Visit  2015 10:40a  [facility] Office  Billy SHIELDS  R05  Cough



       MD Ron    









 K21.9  Gastro-esophageal reflux disease without esophagitis

 

 E55.9  Vitamin D deficiency, unspecified

 

 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

 

 I10  Essential (primary) hypertension

 

 E78.0  Pure hypercholesterolemia

 

 M79.1  Myalgia









 Office Visit  2015  3:00p  [facility] Office  Billy SHIELDS  V70.0  Examination 
General



       MD Ron    Medical Routine AT



           Health Care



           Facility









 737.19  Kyphosis Other Acquired

 

 715.09  Osteoarthrosis Generalized Multiple Sites

 

 727.1  Bunion

 

 401.9  Hypertension Unspec

 

 272.0  Hypercholesterolemia Pure

 

 733.90  Bone & Cartilage Disorder Unspec

 

 V58.69  Medications Long Term (Current) Use Encounter







Plan of Treatment

Future Appointment(s):2019 12:00 pm - Billy Velasquez MD at [facility] 
[id number] - Billy Velasquez MDZ01.818 Encounter for other 
preprocedural examinationComments:Average risk patient for moderate risk 
elective surgery with stable symptoms and examination.There is no 
contraindications for surgery.I10 Essential (primary) hypertensionComments:
Clinically stable. Continue recommended plan of care. Follow up recommendations 
discussed. Encouraged to continue efforts related to modification of life 
style.M16.12 Unilateral primary osteoarthritis, left hip

## 2019-07-30 NOTE — XMS REPORT
Continuity of Care Document (CCD)

 Created on:2019



Patient:Jerzy Barillas

Sex:Female

:1945

External Reference #:[id number]





Demographics







 Address  [address] [place] [address]

 

 Home Phone  [phone]

 

 Mobile Phone  [phone]

 

 Work Phone  [phone]    ;ext=mobil

 

 Email Address  [email or web address]

 

 Preferred Language  en

 

 Marital Status  Not  or 

 

 Restorationism Affiliation  Unknown

 

 Race  White

 

 Ethnic Group  Not  or 









Author







 Name  Billy Velasquez MD

 

 Address  [address] [place] [address]









Support







 Name  Relationship  Address  Phone

 

 Aditi Barillas  Child  Unavailable  [phone]

 

 Sheela Barillas  Child  Unavailable  [phone]









Care Team Providers







 Name  Role  Phone

 

 Billy Velasquez MD FACP  Primary Care Physician  Unavailable









Payers







 Date  Identification Numbers  Payment Provider  Subscriber

 

   Policy Number: [id number]  [employer]-PFF[employer] Medicare Plan  Jerzy Barillas









 Group Number: [id number]   [address] [id number]

 

 PayID: [id number]  [place] [id number]







Problems







 Active Problems  Provider  Date

 

 Acquired kyphosis  Billy Velasquez MD  Onset: 2015

 

 Degenerative joint disease involving multiple  Billy Velasquez MD  Onset: 



 joints    

 

 Bunion  Billy Velasquez MD  Onset: 2015

 

 Essential hypertension  Billy Velasquez MD  Onset: 2015

 

 Pure hypercholesterolemia  Billy Velasquez MD  Onset: 2015

 

 Osteochondropathy  Billy Velasquez MD  Onset: 2015

 

 Localized, primary osteoarthritis of the  Billy Velasquez MD  Onset: 05/15/
2019



 pelvic region and thigh    

 

 Other specified diseases of esophagus  Billy Velasquez MD  Onset: 02/15/2016

 

 Gastroesophageal reflux disease  Billy Velasquez MD  Onset: 02/15/2016

 

 Essential hypertension  Billy Velasquez MD  Onset: 2015

 

 Osteopetrosis  Billy Velasquez MD  Onset: 2015

 

 Vitamin D deficiency  Billy Velasquez MD  Onset: 2015







Family History







 Date  Family Member(s)  Observation  Comments

 

 :  (age 77  Father   due to Unknown  " in sleep"



 Years)    Causes  

 

   Father  Atrial Fibrillation  

 

   Mother  Hypertension  After death of 18 year



       old son in an



       accident.

 

 :  (age 66  Mother   due to Unknown   soon after an



 Years)    Causes  elective surgery for



       kidney neoplasm that



       was benign.







Social History







 Type  Date  Description  Comments

 

 Birth Sex    Unknown  

 

 Marital Status      

 

 Occupation    Real Estate  

 

 Work Status    Currently Working  

 

 ETOH Use    Negative For Never used  



     alcohol  

 

 Tobacco Use  Start: Unknown  Patient has never smoked  

 

 Recreational Drug Use    Negative For Never Used  



     Drugs  

 

 Exercise Type/Frequency    Does not exercise  Active in life but no



       regular exercise.







Allergies, Adverse Reactions, Alerts







 Inactive Allergies  Reaction  Severity  Comments  Date

 

 Cortisone  "face and neck was  Mild  S/P knee injection,  2015



   red"    tolerated prednisone in  



       2015  







Medications







 Active Medications  SIG  Qnty  Indications  Ordering  Date



         Provider  

 

 Prevnar 13  administer half a  1units  Z00.01  Cervantes A  10/29/201



        Suspension  milliliters      MD Ron  8



   intramuscularly one        



   time for inactivated        



   vaccination to        



   prevent pneumococcal        



   infection        

 

 Shingrix  one intramuscular  1units  Z00.01  Cervantes A  10/29/201



     50mcg/0.5ML  dose now and repeat      MD Ron  8



 Suspension Rec  2nd dose after 2-6        



   months.        

 

 Losartan Potassium  Take One Tablet By  90tabs  I10  Cervantes A  



               25mg  Mouth Every Evening      MD Ron  7



 Tablets          

 

 Tylenol Extra Strength  Take 2 tablets by    M15.0  Cervantes A  



                   500mg  mouth every 8 hours      MD Ron  6



 Tablets  as needed for pain        

 

 Hydrochlorothiazide  Take One Capsule By  90caps  I10  Cervantes A  



                12.5mg  Mouth Every Day In      MD Ron  6



 Capsules  The Morning For High        



   Blood Pressure        

 

 Vitamin B Complex-C  1 by mouth every day      Cervantes A  02/15/201



                 Capsules        MD Ron  6



           

 

 Pantoprazole Sodium  Take One Tablet By  90tabs  K21.9  Cervantes A  02/15/201



                20mg  Mouth Every Day      MD Ron  6



 Tablets DR          



           

 

 Vitamin D3  2 by mouth every day    E55.9  Cervantes A  



       2000Unit Chewtabs        MD Ron  5



           









 Q78.2

 

 History Medications









 Simvastatin  1 by mouth every day    E78.00  Angel,  2017 -



             20mg  every night at      Qutaybeh, MD  2017



 Tablets  bedtime        



           

 

 Flovent HFA  inhale 1 puff by  12gm  J98.01  Billy SHIELDS  2016 -



   mouth 2 times per      MD Ron  10/31/2016



 110mcg/Act Aerosol  day for bronchospasm        



           









 R05









 Prednisone  take 2 tablets  14tabs  J98.01  Billy SHIELDS  2016 -



            10mg  by mouth every      MD Ron  2016



 Tablets  day with food        



   for 5 days.        









 R05









 Losartan Potassium  take one tablet  90tabs  I10  Billy SHIELDS  2016 -



             25mg Tablets  by mouth in the      MD Ron  2017



   evening for high        



   blood pressure        

 

 Amoxicillin  Take 1 capsule  30caps    Billy SHIELDS  2016 -



      500mg Capsules  by mouth every 8      MD Ron  2016



   hours for 10        



   days for        



   infection        

 

 Losartan  take one tablet  90tabs  I10  Billy SHIELDS  10/22/2015 -



 Potassium/Hydrochlorothiaz  by mouth once      MD Ron  2016



 cynthia  daily for high        



 50-12.5mg Tablets  blood pressure        



           

 

 Hydrochlorothiazide  Take 1 tablet by  30tabs  I10  Billy SHIELDS  2015 -



              12.5mg  mouth daily for      MD Ron  10/21/2015



 Tablets  high blood        



   pressure        

 

 Vitamin D3  1 by mouth every    E55.9  Cervantes A  2015 -



     2000Unit Capsules  day      MD Ron  2015



           









 Q78.2









 Losartan Potassium  Take 1 tablet by mouth    I10  Angel  2013 -



   daily for high blood      Qutaybeh, MD  10/22/2015



 50mg Tablets  pressure        



           

 

 Simvastatin  Take 1 tablet by mouth    E78.0  Angel  2013 -



             20mg  daily in the evening      Qutaybeh, MD  2015



 Tablets  for hyperlipidemia        



           

 

 Montelukast Sodium  take one tablet by    995.3  Chavo Torrez  2010 -



   mouth in the evening      A., MD  2016



 10mg Tablets  for hayfever        



           

 

 Fosamax  1 by mouth every week    Q78.2  Lorena Washington  2010 -



         70mg  before meals      E., MD  2014



 Tablets          



           

 

 Pantoprazole Sodium  Take 1 tablet by mouth    K21.9  Rich Lamb,   -



   daily for      MD  02/15/2016



 40mg Tablets DR  gastroesophageal reflux        



   disease        

 

 Atenolol  Take 1 tablet by mouth    E78.0  Angel,  1980 -



          25mg  daily for high blood      Qutaybeh, MD  2015



 Tablets  pressure        



           

 

 Lipitor  Take 1 tablet by mouth    E78.0  Unknown  1980 -



         10mg  daily for high        2013



 Tablets  cholesterol        



           







Immunizations







 CPT Code  Status  Date  Vaccine  Lot #

 

 [id number]  Given  2018  Prevnar (Pneumonia) 13 Vaccine  

 

 79961  Given  10/03/2018  Influenza Virus Vaccine, Quadrivalent (Cciiv4),  
[id number]



       Derived From Cell  

 

 [id number]  Given  10/02/2017  Influenza Vaccine Quadrivalent Preser/Antibiotic  
[id number]



       Free Im Use  

 

 24723  Given  2016  Influenza Virus Split 3 Yrs And Above For  [id number]



       Intramuscular Use  

 

 53184  Given  10/18/2015  Influenza Virus Split 3 Yrs And Above For  



       Intramuscular Use  

 

 24005  Given  2015  Pneumococcal Vaccine 2Yrs Or Older  [id number]

 

 62344  Given  2015  Tdap-Tetanus, Diphtheria Toxoids/Acellular  [id number]



       Pertussis Vaccine 7+  

 

 75571  Given  10/01/2014  Zoster Shingles Vaccine For Subcutaneous Injection  







Vital Signs







 Date  Vital  Result  Comment

 

 2019 12:54pm  Weight  168.00 lb  

 

 10/29/2018  1:07pm  Body Temperature  98.2 F  









 O2 % BldC Oximetry  98 %  

 

 Heart Rate  73 /min  

 

 BP Systolic  128 mmHg  

 

 BP Diastolic  65 mmHg  

 

 BMI (Body Mass Index)  32.3 kg/m2  

 

 Weight  171.00 lb  

 

 Height  61 inches  5'1"









 2018 11:42am  Heart Rate  72 /min  









 BP Systolic  130 mmHg  

 

 BP Diastolic  80 mmHg  

 

 BMI (Body Mass Index)  31.6 kg/m2  

 

 Weight  170.00 lb  

 

 Height  61.5 inches  5'1.50"









 2018  1:27pm  Body Temperature  98.2 F  









 Heart Rate  64 /min  

 

 BP Systolic  125 mmHg  

 

 BP Diastolic  75 mmHg  

 

 BMI (Body Mass Index)  31.0 kg/m2  

 

 Weight  167.00 lb  

 

 Height  61.5 inches  5'1.50"









 2018 12:32pm  Heart Rate  64 /min  









 BP Systolic  140 mmHg  

 

 BP Diastolic  60 mmHg  

 

 BP Systolic Recheck  140 mmHg  

 

 BP Diastolic Recheck  60 mmHg  









 10/02/2017  1:50pm  Heart Rate  66 /min  









 BP Systolic  122 mmHg  

 

 BP Diastolic  60 mmHg  

 

 Weight  164.00 lb  









 2017 10:20am  Heart Rate  64 /min  









 BP Systolic  120 mmHg  Supine

 

 BP Diastolic  75 mmHg  Supine

 

 BP Systolic Recheck  110 mmHg  Standing

 

 BP Diastolic Recheck  70 mmHg  Standing

 

 BMI (Body Mass Index)  31.2 kg/m2  

 

 Weight  168.00 lb  

 

 Height  61.5 inches  5'1.50"









 2017 12:46pm  Heart Rate  72 /min  









 BP Systolic  115 mmHg  

 

 BP Diastolic  70 mmHg  









 2016  1:21pm  Body Temperature  99.9 F  









 O2 % BldC Oximetry  98 %  

 

 Heart Rate  76 /min  

 

 BP Systolic  110 mmHg  

 

 BP Diastolic  70 mmHg  









 2016 12:43pm  Body Temperature  98.4 F  









 O2 % BldC Oximetry  98 %  

 

 Heart Rate  68 /min  









 2016 10:04am  O2 % BldC Oximetry  98 %  









 Heart Rate  70 /min  

 

 BP Systolic  105 mmHg  

 

 BP Diastolic  65 mmHg  

 

 BMI (Body Mass Index)  30.2 kg/m2  

 

 Weight  164.00 lb  

 

 Height  61.75 inches  5'1.75"









 2016 10:09am  Heart Rate  64 /min  88 Standing









 BP Systolic  110 mmHg  Supine

 

 BP Diastolic  70 mmHg  Supine

 

 BP Systolic Recheck  100 mmHg  Standing

 

 BP Diastolic Recheck  60 mmHg  Standing









 2016  4:21pm  Body Temperature  98.8 F  









 O2 % BldC Oximetry  98 %  

 

 Heart Rate  81 /min  

 

 Weight  164.00 lb  









 02/15/2016 11:37am  Heart Rate  64 /min  









 BP Systolic  125 mmHg  

 

 BP Diastolic  80 mmHg  









 10/22/2015 11:33am  Heart Rate  64 /min  









 BP Systolic  122 mmHg  

 

 BP Diastolic  70 mmHg  









 2015  3:29pm  Body Temperature  98.7 F  









 O2 % BldC Oximetry  96 %  

 

 Heart Rate  81 /min  

 

 BP Systolic  130 mmHg  

 

 BP Diastolic  70 mmHg  

 

 BMI (Body Mass Index)  32.7 kg/m2  

 

 Weight  176.00 lb  

 

 Height  61.5 inches  5'1.50"









 2015 10:47am  Ejection Fraction  65%  ECHO







Results







 Test  Date  Facility  Test  Result  H/L  Range  Note

 

 Urinalysis Profile  2019  [facility]  Urine Color  Yellow    
  



     [place], [place] [address] [phone]          









 Urine Appearance  Clear      

 

 Urine Specific Gravity  1.013  N  1.010-1.030  

 

 Urine pH  5.0  N  5-9  

 

 Urine Urobilinogen  Negative    Negative  

 

 Urine Ketones  Negative    Negative  

 

 Urine Protein  Negative    Negative  

 

 Urine Leukocytes  2+  Abnormal  Negative  

 

 Urine Blood  Negative    Negative  

 

 Urine Nitrite  Negative    Negative  

 

 Urine Bilirubin  Negative    Negative  

 

 Urine Glucose  Negative    Negative  

 

 Urine White Blood Cell  2+(11-20/hpf)  Abnormal  Absent  

 

 Urine Red Blood Cell  Trace(0-2/hpf)    Absent  

 

 Urine Bacteria  1+  Abnormal  Absent  









 Inr/Protime  2019  [facility]  Inr  1.04  N  0.82-1.09  1



     [place] [address]          



     [phone]          

 

 Laboratory test  2019  [facility]  Partial  37.8 seconds  N  
26.0-38.0  



 [place] [address]  Thrombo Time        



     [phone]  PTT        

 

 CBC Auto Diff  2019  [facility]  White Blood  6.2 10^3/uL  N  
3.5-10.8  



     [place] [address]  Count        



     [phone]          









 Red Blood Count  4.19 10^6/uL  N  3.70-4.87  

 

 Hemoglobin  12.7 g/dL  N  12.0-16.0  

 

 Hematocrit  38 %  N  35-47  

 

 Mean Corpuscular Volume  92 fL  N  80-97  

 

 Mean Corpuscular Hemoglobin  30 pg  N  27-31  

 

 Mean Corpuscular HGB Conc  33 g/dL  N  31-36  

 

 Red Cell Distribution Width  14 %  N  10-15  

 

 Platelet Count  261 10^3/uL  N  150-450  

 

 Mean Platelet Volume  7.8 fL  N  7.4-10.4  

 

 Abs Neutrophils  3.1 10^3/uL  N  1.5-7.7  

 

 Abs Lymphocytes  2.3 10^3/uL  N  1.0-4.8  

 

 Abs Monocytes  0.6 10^3/uL  N  0-0.8  

 

 Abs Eosinophils  0.1 10^3/uL  N  0-0.6  

 

 Abs Basophils  0.1 10^3/uL  N  0-0.2  

 

 Abs Nucleated RBC  0.0 10^3/uL      

 

 Granulocyte %  50.1 %      

 

 Lymphocyte %  36.8 %      

 

 Monocyte %  9.9 %      

 

 Eosinophil %  2.2 %      

 

 Basophil %  1.0 %      

 

 Nucleated Red Blood Cells %  0.0      









 Type & Screen  2019  [facility]  Patient Blood Type  AB 
Positive      



     [place] [address]          



     [phone]          









 Antibody Screen  NEGATIVE      









 Comp Metabolic Panel  2019  [facility]  Sodium  137 mmol/L  N
  135-145  



     [place] [address]          



     [phone]          









 Potassium  3.8 mmol/L  N  3.5-5.0  

 

 Chloride  100 mmol/L  Low  101-111  

 

 Co2 Carbon Dioxide  29 mmol/L  N  22-32  

 

 Anion Gap  8 mmol/L  N  2-11  

 

 Glucose  94 mg/dL  N    

 

 Blood Urea Nitrogen  18 mg/dL  N  6-24  

 

 Creatinine  0.91 mg/dL  N  0.51-0.95  

 

 BUN/Creatinine Ratio  19.8  N  8-20  

 

 Calcium  9.6 mg/dL  N  8.6-10.3  

 

 Total Protein  7.2 g/dL  N  6.4-8.9  

 

 Albumin  4.2 g/dL  N  3.2-5.2  

 

 Globulin  3.0 g/dL  N  2-4  

 

 Albumin/Globulin Ratio  1.4  N  1-3  

 

 Total Bilirubin  0.60 mg/dL  N  0.2-1.0  

 

 Alkaline Phosphatase  52 U/L  N    

 

 Alt  17 U/L  N  7-52  

 

 Ast  26 U/L  N  13-39  

 

 Egfr Non-  60.1    >60  

 

 Egfr   72.7    >60  2









 Urine Culture And  2019  [facility]  Urine Culture  SEE 
RESULT      3



 Sensitivities    [place] [address]    BELOW      



     [phone]          

 

 Laboratory test  2019  [facility]  Cytology  SEE RESULT      4



 finding    [place] [address]    BELOW      



     [phone]          

 

 Laboratory test  2019  [facility]  Surgical  SEE RESULT      5
, 6



 finding    [place] [address]  Pathology  BELOW      



     [phone]          

 

 CBC No Diff  10/27/2018  [facility]  White Blood  6.4 10^3/uL  N  
3.5-1  



     [place] [address]  Count      0.8  



     [phone]          









 Red Blood Count  4.50 10^6/uL  N  4.00-5.40  

 

 Hemoglobin  14.0 g/dL  N  12.0-16.0  

 

 Hematocrit  42 %  N  35-47  

 

 Mean Corpuscular Volume  93 fL  N  80-97  

 

 Mean Corpuscular Hemoglobin  31 pg  N  27-31  

 

 Mean Corpuscular HGB Conc  34 g/dL  N  31-36  

 

 Red Cell Distribution Width  14 %  N  10.5-15  

 

 Platelet Count  292 10^3/uL  N  150-450  

 

 Mean Platelet Volume  8.8 um3  N  7.4-10.4  









 Basic Metabolic Panel  10/27/2018  [facility]  Sodium  141 mmol/L  
N  135-145  



     [place] [address]          



     [phone]          









 Potassium  4.7 mmol/L  N  3.5-5.0  

 

 Chloride  102 mmol/L  N  101-111  

 

 Co2 Carbon Dioxide  30 mmol/L  N  22-32  

 

 Anion Gap  9 mmol/L  N  2-11  

 

 Glucose  92 mg/dL  N    

 

 Blood Urea Nitrogen  14 mg/dL  N  6-24  

 

 Creatinine  0.85 mg/dL  N  0.51-0.95  

 

 BUN/Creatinine Ratio  16.5  N  8-20  

 

 Calcium  9.6 mg/dL  N  8.6-10.3  

 

 Egfr Non-  65.2    >60  

 

 Egfr   78.9    >60  7









 Laboratory test  10/27/2018  [facility]  Vitamin D  53.9 ng/mL    20
-100  



 [place] [address]  Total 25(Oh)        



     [phone]          

 

 Xray  2018  [facility]  Hips,  Severe OA      



     [address]  Bilateral,        



     [place] [address]  Min. Of 2        



     [phone]  Veiw/Hip        

 

 Comp Metabolic  2018  [facility]  Sodium  142 mmol/L  N  135-
145  



 [place] [address]          



     [phone]          









 Potassium  4.1 mmol/L  N  3.5-5.0  

 

 Chloride  104 mmol/L  N  101-111  

 

 Co2 Carbon Dioxide  30 mmol/L  N  22-32  

 

 Anion Gap  8 mmol/L  N  2-11  

 

 Glucose  90 mg/dL  N    

 

 Blood Urea Nitrogen  17 mg/dL  N  6-24  

 

 Creatinine  0.83 mg/dL  N  0.51-0.95  

 

 BUN/Creatinine Ratio  20.5  High  8-20  

 

 Calcium  9.5 mg/dL  N  8.6-10.3  

 

 Total Protein  7.0 g/dL  N  6.4-8.9  

 

 Albumin  4.1 g/dL  N  3.2-5.2  

 

 Globulin  2.9 g/dL  N  2-4  

 

 Albumin/Globulin Ratio  1.4  N  1-3  

 

 Total Bilirubin  0.80 mg/dL  N  0.2-1.0  

 

 Alkaline Phosphatase  53 U/L  N    

 

 Alt  19 U/L  N  7-52  

 

 Ast  29 U/L  N  13-39  

 

 Egfr Non-  67.0    >60  

 

 Egfr   81.1    >60  8









 Lipid Profile  2018  [facility]  Triglycerides  77 mg/dL      
9



 (Trig/Chol/[place] [address]          



     [phone]          









 Cholesterol  216 mg/dL      10

 

 HDL Cholesterol  73.6 mg/dL      11

 

 LDL Cholesterol  127 mg/dL      12









 Laboratory test  2018  [facility]  Creatine  79 U/L  N  10-
223  



 [place] [address]  Kinase(CK)        



     [phone]          

 

 Lipid Profile  2018  [facility]  Triglycerides  63    <150  13
, 14



 (Trig/Chol/[place] [address]    mg/dL      



     [phone]          









 Cholesterol  253 mg/dL  High  <200  15

 

 HDL Cholesterol  74.4 mg/dL    >40  16

 

 LDL Cholesterol  166 mg/dL  High  <160  17









 CBC No Diff  2018  [facility]  White Blood  5.1 10^3/uL  N  
3.5-10.8  18



     [place] [address]  Count        



     [phone]          









 Red Blood Count  4.26 10^6/uL  N  4.0-5.4  

 

 Hemoglobin  13.2 g/dL  N  12.0-16.0  

 

 Hematocrit  40 %  N  35-47  

 

 Mean Corpuscular Volume  93 fL  N  80-97  

 

 Mean Corpuscular Hemoglobin  31 pg  N  27-31  

 

 Mean Corpuscular HGB Conc  33 g/dL  N  31-36  

 

 Red Cell Distribution Width  13 %  N  10.5-15  

 

 Platelet Count  268 10^3/uL  N  150-450  

 

 Mean Platelet Volume  8 um3  N  7.4-10.4  









 Comp Metabolic Panel  2018  [facility]  Sodium  138 mmol/L  N
  133-145  



     [place] [address]          



     [phone]          









 Potassium  4.0 mmol/L  N  3.5-5.0  

 

 Chloride  102 mmol/L  N  101-111  

 

 Co2 Carbon Dioxide  30 mmol/L  N  22-32  

 

 Anion Gap  6 mmol/L  N  2-11  

 

 Glucose  89 mg/dL  N    

 

 Blood Urea Nitrogen  15 mg/dL  N  6-24  

 

 Creatinine  0.86 mg/dL  N  0.51-0.95  

 

 BUN/Creatinine Ratio  17.4  N  8-20  

 

 Calcium  9.6 mg/dL  N  8.6-10.3  

 

 Total Protein  7.1 g/dL  N  6.4-8.9  

 

 Albumin  4.1 g/dL  N  3.2-5.2  

 

 Globulin  3.0 g/dL  N  2-4  

 

 Albumin/Globulin Ratio  1.4  N  1-3  

 

 Total Bilirubin  0.70 mg/dL  N  0.2-1.0  

 

 Alkaline Phosphatase  50 U/L  N    

 

 Alt  21 U/L  N  7-52  

 

 Ast  28 U/L  N  13-39  

 

 Egfr Non-  64.3    >60  

 

 Egfr   82.7    >60  19









 Laboratory test  2018  [facility]  Vitamin D Total  55.0  
High  20-50  20



 [place] [address]  25(Oh)  ng/mL      



     [phone]          

 

 Laboratory test  2018  [facility]  CRP High  3.93 mg/L  High  
1-3  21, 22



 [place] [address]  Sensitivity        



     [phone]          

 

 Laboratory test  06/15/2017  [facility]  Folic Acid  > 20.00  N  >
3.99  



 [place] [address]  (Folate)  ng/mL      



     [phone]          









 Vitamin B12  653 pg/mL  N  180-914  23

 

 Vitamin D 1,25-Dihydroxy  51 pg/mL  N  18-78  24









 Comp Metabolic Panel  06/15/2017  [facility]  Sodium  138 mmol/L  N
  133-145  



     [place] [address]          



     [phone]          









 Potassium  4.0 mmol/L  N  3.5-5.0  

 

 Chloride  99 mmol/L  Low  101-111  

 

 Co2 Carbon Dioxide  24 mmol/L  N  22-32  

 

 Anion Gap  15 mmol/L  High  2-11  

 

 Glucose  100 mg/dL  N    

 

 Blood Urea Nitrogen  21 mg/dL  N  6-24  

 

 Creatinine  0.84 mg/dL  N  0.51-0.95  

 

 BUN/Creatinine Ratio  25.0  High  8-20  

 

 Calcium  9.4 mg/dL  N  8.6-10.3  

 

 Total Protein  7.2 g/dL  N  6.4-8.9  

 

 Albumin  4.0 g/dL  N  3.2-5.2  

 

 Globulin  3.2 g/dL  N  2-4  

 

 Albumin/Globulin Ratio  1.3  N  1-3  

 

 Total Bilirubin  0.90 mg/dL  N  0.2-1.0  

 

 Alkaline Phosphatase  48 U/L  N    

 

 Alt  15 U/L  N  7-52  

 

 Ast  26 U/L  N  13-39  

 

 Egfr Non-  66.3  N  >60  

 

 Egfr   85.2  N  >60  25









 Lipid Profile  06/15/2017  [facility]  Triglycerides  62 mg/dL  N  <
150  26



 (Trig/Chol/[place] [address] [phone]          









 Cholesterol  262 mg/dL  High  <200  27

 

 HDL Cholesterol  70.9 mg/dL  N  >40  28

 

 LDL Cholesterol  179 mg/dL  High  <160  29









 Laboratory test  06/15/2017  [facility]  Magnesium  1.9 mg/dL  N  
1.9-2.7  



 [place] [address] [phone]          









 TSH (Thyroid Stim Horm)  2.01 mcIU/mL  N  0.34-5.60  

 

 Creatine Kinase(CK)  80 U/L  N    









 Basic Metabolic Panel  2017  [facility]  Sodium  138 mmol/L  
N  133-145  



     [place] [address] [phone]          









 Potassium  4.3 mmol/L  N  3.5-5.0  

 

 Chloride  100 mmol/L  Low  101-111  

 

 Co2 Carbon Dioxide  32 mmol/L  N  22-32  

 

 Anion Gap  6 mmol/L  N  2-11  

 

 Glucose  93 mg/dL  N    

 

 Blood Urea Nitrogen  18 mg/dL  N  6-24  

 

 Creatinine  0.81 mg/dL  N  0.51-0.95  

 

 BUN/Creatinine Ratio  22.2  High  8-20  

 

 Calcium  9.4 mg/dL  N  8.6-10.3  

 

 Egfr Non-  69.1  N  >60  

 

 Egfr   88.9  N  >60  30









 Lipid Profile  2017  [facility]  Triglycerides  81 mg/dL  N  <
150  31



 (Trig/Chol/[place] [address] [phone]          









 Cholesterol  251 mg/dL  High  <200  32

 

 HDL Cholesterol  65.1 mg/dL  N  >40  33

 

 LDL Cholesterol  170 mg/dL  High  <130  34









 Xray  2016  [facility]  Dexa Scan,  Osteopenia      



     PASCUAL Roche (e.[place]        



     [place] [address]  hips, pelvis,        



     [phone]  spine)        

 

 CBC No Diff  2016  [facility]  White Blood  6.2 10^3/uL  N  
3.5-10.8  



     [place] [address]  Count        



     [phone]          









 Red Blood Count  4.20 10^6/uL  N  4.0-5.4  

 

 Hemoglobin  13.0 g/dL  N  12.0-16.0  

 

 Hematocrit  38 %  N  35-47  

 

 Mean Corpuscular Volume  91 fL  N  80-97  

 

 Mean Corpuscular Hemoglobin  31 pg  N  27-31  

 

 Mean Corpuscular HGB Conc  34 g/dL  N  31-36  

 

 Red Cell Distribution Width  14 %  N  10.5-15  

 

 Platelet Count  272 10^3/uL  N  150-450  

 

 Mean Platelet Volume  8 um3  N  7.4-10.4  









 Lipid Profile  2016  [facility]  Triglycerides  67 mg/dL  N  <
150  35



 (Trig/Chol/[place] [address]          



     [phone]          









 Cholesterol  242 mg/dL  High  <200  36

 

 HDL Cholesterol  71.0 mg/dL  N  >35  37

 

 LDL Cholesterol  158 mg/dL  N  <160  38









 Laboratory test  2016  [facility]  Vitamin B12  885 pg/mL  N  
180-914  39



 finding    [place], [place] [id number] [phone]          









 Vitamin D Total 25(Oh)  52.6 ng/mL  High  30-50  









 Comp Metabolic Panel  2016  [facility]  Sodium  138 mmol/L  N
  133-145  



     [place], [place] [id number] [phone]          









 Potassium  4.4 mmol/L  N  3.5-5.0  

 

 Chloride  101 mmol/L  N  101-111  

 

 Co2 Carbon Dioxide  30 mmol/L  N  22-32  

 

 Anion Gap  7 mmol/L  N  2-11  

 

 Glucose  92 mg/dL  N    

 

 Blood Urea Nitrogen  18 mg/dL  N  6-24  

 

 Creatinine  0.78 mg/dL  N  0.51-0.95  

 

 BUN/Creatinine Ratio  23.1  High  8-20  

 

 Calcium  9.2 mg/dL  N  8.6-10.3  

 

 Total Protein  7.2 g/dL  N  6.4-8.9  

 

 Albumin  4.0 g/dL  N  3.2-5.2  

 

 Globulin  3.2 g/dL  N  2-4  

 

 Albumin/Globulin Ratio  1.3  N  1-3  

 

 Total Bilirubin  0.70 mg/dL  N  0.2-1.0  

 

 Alkaline Phosphatase  50 U/L  N    

 

 Alt  27 U/L  N  7-52  

 

 Ast  35 U/L  N  13-39  

 

 Egfr Non-  72.4  N  >60  

 

 Egfr   93.1  N  >60  40









 Lipid Profile  2016  [facility]  Triglycerides  62 mg/dL  N  <
150  41



 (Trig/Chol/HDL)    [place], [place] [id number] [phone]          









 Cholesterol  214 mg/dL  High  <200  42

 

 HDL Cholesterol  65.7 mg/dL  N  >35  43

 

 LDL Cholesterol  136 mg/dL  High  <130  44









 Laboratory test  2016  [facility]  Vitamin D  39.3 ng/mL  N  
30-50  



 [place] [address]  Total 25(Oh)        



     [phone]          

 

 Basic Metabolic  2016  [facility]  Sodium  140 mmol/L  N  133-
145  



 Panel    [place], [place] [id number] [phone]          









 Potassium  4.0 mmol/L  N  3.5-5.0  

 

 Chloride  104 mmol/L  N  101-111  

 

 Co2 Carbon Dioxide  28 mmol/L  N  22-32  

 

 Anion Gap  8 mmol/L  N  2-11  

 

 Glucose  96 mg/dL  N    

 

 Blood Urea Nitrogen  16 mg/dL  N  6-24  

 

 Creatinine  0.85 mg/dL  N  0.51-0.95  

 

 BUN/Creatinine Ratio  18.8  N  8-20  

 

 Calcium  8.7 mg/dL  N  8.6-10.3  

 

 Egfr Non-  65.6  N  >60  

 

 Egfr   84.3  N  >60  45









 Basic Metabolic Panel  2015  [facility]  Sodium  136 mmol/L  
N  133-145  



     [place] [address] [phone]          









 Potassium  3.7 mmol/L  N  3.5-5.0  

 

 Chloride  99 mmol/L  Low  101-111  

 

 Co2 Carbon Dioxide  30 mmol/L  N  22-32  

 

 Anion Gap  7 mmol/L  N  2-11  

 

 Glucose  91 mg/dL  N    

 

 Blood Urea Nitrogen  14 mg/dL  N  6-24  

 

 Creatinine  0.89 mg/dL  N  0.51-0.95  

 

 BUN/Creatinine Ratio  15.7  N  8-20  

 

 Calcium  9.2 mg/dL  N  8.6-10.3  

 

 Egfr Non-  62.3  N  >60  

 

 Egfr   80.2  N  >60  46









 Basic Metabolic  10/19/2015  [facility]  Sodium  132 mmol/L  Low  
133-145  



 [place] [address] [phone]          









 Potassium  3.5 mmol/L  N  3.5-5.0  

 

 Chloride  97 mmol/L  Low  101-111  

 

 Co2 Carbon Dioxide  29 mmol/L  N  22-32  

 

 Anion Gap  6 mmol/L  N  2-11  

 

 Glucose  98 mg/dL  N    

 

 Blood Urea Nitrogen  15 mg/dL  N  6-24  

 

 Creatinine  0.83 mg/dL  N  0.51-0.95  

 

 BUN/Creatinine Ratio  18.1  N  8-20  

 

 Calcium  9.4 mg/dL  N  8.6-10.3  

 

 Egfr Non-  67.6  N  >60  

 

 Egfr   86.9  N  >60  47









 CBC No Diff  2015  [facility]  White Blood  5.8 10^3/uL  N  
4.8-10.8  48



     [place] [address]  Count        



     [phone]          









 Red Blood Count  4.49 10^6/uL  N  4.0-5.4  

 

 Hemoglobin  14.0 g/dL  N  12.0-16.0  

 

 Hematocrit  42 %  N  35-47  

 

 Mean Corpuscular Volume  94 fL  N  80-97  

 

 Mean Corpuscular Hemoglobin  31 pg  N  27-31  

 

 Mean Corpuscular HGB Conc  33 g/dL  N  31-36  

 

 Red Cell Distribution Width  14 %  N  10.5-15  

 

 Platelet Count  278 10^3/uL  N  150-450  

 

 Mean Platelet Volume  8 um3  N  7.4-10.4  









 Comp Metabolic Panel  2015  [facility]  Sodium  137 mmol/L  N
  133-145  



     [place] [address] [phone]          









 Potassium  4.1 mmol/L  N  3.5-5.0  

 

 Chloride  101 mmol/L  N  101-111  

 

 Co2 Carbon Dioxide  28 mmol/L  N  22-32  

 

 Anion Gap  8 mmol/L  N  2-11  

 

 Glucose  84 mg/dL  N    

 

 Blood Urea Nitrogen  22 mg/dL  N  6-24  

 

 Creatinine  0.87 mg/dL  N  0.51-0.95  

 

 BUN/Creatinine Ratio  25.3  High  8-20  

 

 Calcium  9.4 mg/dL  N  8.6-10.3  

 

 Total Protein  7.1 g/dL  N  6.4-8.9  

 

 Albumin  4.1 g/dL  N  3.2-5.2  

 

 Globulin  3.0 g/dL  N  2-4  

 

 Albumin/Globulin Ratio  1.4  N  1-3  

 

 Total Bilirubin  0.80 mg/dL  N  0.2-1.0  

 

 Alkaline Phosphatase  47 U/L  N    

 

 Alt  26 U/L  N  7-52  

 

 Ast  37 U/L  N  13-39  

 

 Egfr Non-  64.0  N  >60  

 

 Egfr   82.3  N  >60  49









 Laboratory test  2015  [facility]  TSH (Thyroid  2.15 ?IU/mL  
N  0.34-5.60  50



 [place] [address]  Stim Horm)        



     [phone]          









 Vitamin D Total 25(Oh)  24.1 ng/mL  Low  30-50  51

 

 Vitamin B12  930 pg/mL  High  180-914  52









 Lipid Profile  2015  [facility]  Triglycerides  57 mg/dL  N  
  53



 (Trig/Chol/HDL)    [place], [place] [id number] [phone]          









 Cholesterol  170 mg/dL  N    54

 

 HDL Cholesterol  66.3 mg/dL  N    55

 

 LDL Cholesterol  92 mg/dL  N    56









 Urine Microalbumin  2015  [facility]  Ur Microalbumin  17.0 mg
/L  N    



 [place] [address]  (mg/L)        



     [phone]          









 Urine Creatinine  146.92 mg/dL  N    

 

 Urine Microalbumin/Creatinine  11.5 ug/mg  N  <31  









 1  Standard intensity warfarin therapeutic range: 2.0-3.0



   High intensity warfarin therapeutic range: 2.5-3.5

 

 2  *******Because ethnic data is not always readily available,



   this report includes an eGFR for both -Americans and



   non- Americans.****



   The National Kidney Disease Education Program (NKDEP) does



   not endorse the use of the MDRD equation for patients that



   are not between the ages of 18 and 70, are pregnant, have



   extremes of body size, muscle mass, or nutritional status,



   or are non- or non-.



   According to the National Kidney Foundation, irrespective of



   diagnosis, the stage of the disease is based on the level of



   kidney function:



   Stage Description                      GFR(mL/min/1.73 m(2))



   1     Kidney damage with normal or decreased GFR       90



   2     Kidney damage with mild decrease in GFR          60-89



   3     Moderate decrease in GFR                         30-59



   4     Severe decrease in GFR                           15-29



   5     Kidney failure                       <15 (or dialysis)

 

 3  SEE RESULT BELOW



   -----------------------------------------------------------------------------
---------------



   Name:  JERZY BARILLAS                : 1945    Attend Dr: Shabana Vargas MD



   Acct:  [id number]  Unit: [id number]  AGE: 74            Location:  [facility]



   Re19                        SEX: F             Status:    REG REF



   -----------------------------------------------------------------------------
---------------



   



   SPEC: [id number]         TIFFANIE:   160    [facility] DR: Shabana Vargas MD



   REQ:  [id number]              RECD:   



   STATUS: REKHA PELAYO DR: Billy Velasquez MD



   _



   SOURCE: URINE          SPDESC:



   ORDERED:  Urine Culture



   QUERIES:  Urine Source: Clean Catch



   



   -----------------------------------------------------------------------------
---------------



   Procedure                         Result                         Reported   
        Site



   -----------------------------------------------------------------------------
---------------



   Urine Culture  Final                                             19-
0835      ML



   



   Mixed upon extended incubation. Suggest resubmission.



   



   -----------------------------------------------------------------------------
---------------



   * ML - Main Lab



   .



   



   



   



   



   



   



   



   



   



   



   



   



   



   



   



   



   



   



   



   



   



   



   



   



   



   ** END OF REPORT **



   



   DEPARTMENT OF PATHOLOGY,  [address] [place] [address]



   Phone # [phone]      Fax #[phone]



   Tyrese Fairbanks M.D. Director     [facility] # [id number]

 

 4  SEE RESULT BELOW



   -----------------------------------------------------------------------------
---------------



   Name:  JERZY BARILLAS                : 1945    Attend Dr: 
Lorena Washington MD



   Acct:  [id number]  Unit: [id number]  AGE: 74            Location:  [facility]



   Re19                        SEX: F             Status:    REG REF



   -----------------------------------------------------------------------------
---------------



   



   SPEC: [id number]            TIFFANIE:       [facility] DR: Lorena Washington MD



   REQ:  [id number]             RECD: 



   STATUS: DENA PELAYO DR: Billy Velasquez MD



   _



   ORDERED:  TP IMAGE ANALYS, HPV/Thin Prep



   COMMENTS: [id number]



   Negative for Intraepithelial lesion or Malignancy



   



   -----------------------------------------------------------------------------
---------------



   Date     Time Test              Result   Flag (u) Normal Range



   19 142   HPV RNA         Negative          Negative



   



   The high-risk HPV types detected by the assay include: 16,



   18, 31, 33, 35, 39, 45, 51, 52, 56, 58, 59, 66, and 68.



   -----------------------------------------------------------------------------
---------------



   



   A. Ectocervical/Endocervical



   Specimen Adequacy:



   Satisfactory of evaluation



   Transformation zone component not identified



   Patient Information:



   HPV: High risk HPV RNA testing regardless of pap results.



   Actual Specimen Date:         19



   LMP If Unknown:               



   Date of Last Specimen:        17



   



   Signed by and Reported on: __________              LISET Arango(ASCP)  [id number]



   



   This Pap test was evaluated with the assistance of the ThinPrep Test Imaging 
System. Due to



   cytologic findings at the imager microscope, comprehensive manual 
rescreening by a



   Cytotechnologist may be required.



   The Pap Smear is a screening test designed to aid in the detection of 
premalignant and



   malignant conditions of the uterine cervix.  It is not a diagnostic 
procedure and should



   not be used as the sole means of detecting cervical cancer.  Both false-
positive and false-



   negative reports do occur.  Depending on your risk status, a Pap smear 
should be obtained



   and evaluated every 1-3 years.



   



   -----------------------------------------------------------------------------
---------------



   



   



   



   



   



   ** END OF REPORT **



   



   DEPARTMENT OF PATHOLOGY,  [address] [place] [address]



   Phone # [phone]      Fax #[phone]



   Tyrese Fairbanks M.D. Director     [facility] # [id number]

 

 5  [id number]

 

 6  SEE RESULT BELOW



   -----------------------------------------------------------------------------
---------------



   Name:  JERZY BARILLAS                : 1945    Attend Dr: Rich Lamb MD



   Acct:  [id number]  Unit: [id number]  AGE: 74            Location:  [facility]



   Re19                        SEX: F             Status:    DEP REF



   -----------------------------------------------------------------------------
---------------



   



   SPEC: [id number]             TIFFANIE: [facility] DR: Rich Lamb MD



   REQ:  [id number]             RECD: 



   STATUS: DENA PELAYO DR: Billy Velasquez MD



   _



   ORDERED:  LEVEL 4/3



   COMMENTS: [id number]



   



   FINAL DIAGNOSIS



   



   



   1.   Stomach, body, biopsy:



   -- Body-type gastric mucosa with mild chronic gastritis.



   -- No evidence of Helicobacter organisms.



   



   2.   Stomach, biopsy:



   -- Fundic gland polyp.



   



   3.   Colon, at 30 cm, biopsy:



   -- Tubular adenoma.



   -- No high grade dysplasia or malignancy.



   



   



   -----------------------------------------------------------------------------
---------------



   



   



   



   CLINICAL HISTORY



   



   Distention



   



   



   POST-OPERATIVE DIAGNOSIS



   



   EGD: esophagus - tortuous; gastric - polyp - biopsy; body biopsy; 
colonoscopy - 30 cm polyp;



   to terminal ileum; normal



   



   



   GROSS DESCRIPTION



   



   1.   The specimen is received in formalin labeled, Gastric Body Biopsy, and 
consists of a



   0.5 x 0.3 x 0.1 cm tan-pink irregular soft tissue fragment which is 
submitted entirely in



   one cassette.



   



   



   ** CONTINUED ON NEXT PAGE **



   



   DEPARTMENT OF PATHOLOGY,  [address] [place] [address]



   Phone # [phone]      Fax #[phone]



   Tyrese Fairbanks M.D. Director     [facility] # [id number]



   



   



   



   RUN DATE: 04/10/19               [facility] **LIVE**         
       PAGE    2



   



   -----------------------------------------------------------------------------
---------------



   Patient: JERZY BARILLAS                 [id number]     (Continued)



   -----------------------------------------------------------------------------
---------------



   



   GROSS DESCRIPTION          (Continued)



   



   2.   The specimen is received in formalin labeled, Biopsy Gastric Polyp, and 
consists of a



   0.4 x 0.3 x 0.3 cm tan-pink polypoid soft tissue fragment which is submitted 
entirely in one



   cassette.



   



   3.   The specimen is received in formalin labeled, Colon Polyp at 30 cm, and 
consists of two



   tan-white irregular to polypoid soft tissue fragments measuring 0.3 x 0.2 x 
0.1 cm and 0.4 x



   0.2 x 0.2 cm which are submitted entirely in one cassette.



   



   Signed by and Reported on: __________              Christen Fraga MD 
04/10/19 1549



   



   -----------------------------------------------------------------------------
---------------



   



   



   



   



   



   



   



   



   



   



   



   



   



   



   



   



   



   



   



   



   



   



   



   



   



   



   



   



   



   



   



   



   ** END OF REPORT **



   



   DEPARTMENT OF PATHOLOGY,  [address] [place] [address]



   Phone # [phone]      Fax #[phone]



   Tyrese Fairbanks M.D. Director     [facility] # [id number]

 

 7  *******Because ethnic data is not always readily available,



   this report includes an eGFR for both -Americans and



   non- Americans.****



   The National Kidney Disease Education Program (NKDEP) does



   not endorse the use of the MDRD equation for patients that



   are not between the ages of 18 and 70, are pregnant, have



   extremes of body size, muscle mass, or nutritional status,



   or are non- or non-.



   According to the National Kidney Foundation, irrespective of



   diagnosis, the stage of the disease is based on the level of



   kidney function:



   Stage Description                      GFR(mL/min/1.73 m(2))



   1     Kidney damage with normal or decreased GFR       90



   2     Kidney damage with mild decrease in GFR          60-89



   3     Moderate decrease in GFR                         30-59



   4     Severe decrease in GFR                           15-29



   5     Kidney failure                       <15 (or dialysis)

 

 8  *******Because ethnic data is not always readily available,



   this report includes an eGFR for both -Americans and



   non- Americans.****



   The National Kidney Disease Education Program (NKDEP) does



   not endorse the use of the MDRD equation for patients that



   are not between the ages of 18 and 70, are pregnant, have



   extremes of body size, muscle mass, or nutritional status,



   or are non- or non-.



   According to the National Kidney Foundation, irrespective of



   diagnosis, the stage of the disease is based on the level of



   kidney function:



   Stage Description                      GFR(mL/min/1.73 m(2))



   1     Kidney damage with normal or decreased GFR       90



   2     Kidney damage with mild decrease in GFR          60-89



   3     Moderate decrease in GFR                         30-59



   4     Severe decrease in GFR                           15-29



   5     Kidney failure                       <15 (or dialysis)

 

 9  Desirable: <150



   Borderline High: 150-199



   High: 200-499



   Very High: >500

 

 10  Desirable: <200



   Borderline High: 200-239



   High: >239

 

 11  Low: <40



   Desirable: 40-60



   High: >60

 

 12  Desirable: <100



   Near Optimal: 100-129



   Borderline High: 130-159



   High: 160-189



   Very High: >189

 

 13  FASTING

 

 14  Desirable: <150



   Borderline High: 150-199



   High: 200-499



   Very High: >500

 

 15  Desirable: <200



   Borderline High: 200-239



   High: >239

 

 16  Low: <40



   Desirable: 40-60



   High: >60

 

 17  Desirable: <100



   Near Optimal: 100-129



   Borderline High: 130-159



   High: 160-189



   Very High: >189

 

 18  System Event: N  Abn NRBC Pattern  Low Events: N FASTING

 

 19  *******Because ethnic data is not always readily available,



   this report includes an eGFR for both -Americans and



   non- Americans.****



   The National Kidney Disease Education Program (NKDEP) does



   not endorse the use of the MDRD equation for patients that



   are not between the ages of 18 and 70, are pregnant, have



   extremes of body size, muscle mass, or nutritional status,



   or are non- or non-.



   According to the National Kidney Foundation, irrespective of



   diagnosis, the stage of the disease is based on the level of



   kidney function:



   Stage Description                      GFR(mL/min/1.73 m(2))



   1     Kidney damage with normal or decreased GFR       90



   2     Kidney damage with mild decrease in GFR          60-89



   3     Moderate decrease in GFR                         30-59



   4     Severe decrease in GFR                           15-29



   5     Kidney failure                       <15 (or dialysis)

 

 20  FASTING

 

 21  FASTING

 

 22  Low risk: <1.00



   Average risk: 1.00-3.00



   High risk: >3.00

 

 23  Normal Range 180 to 914



   Indeterminate Range 145 to 180



   Deficient Range  <145

 

 24  -------------------ADDITIONAL INFORMATION-------------------



   This test was developed and its performance characteristics



   determined by [facility] in a manner consistent with CLIA



   requirements. This test has not been cleared or approved by



   the U.S. Food and Drug Administration.



   Test Performed by:



   [address] [address]

 

 25  *******Because ethnic data is not always readily available,



   this report includes an eGFR for both -Americans and



   non- Americans.****



   The National Kidney Disease Education Program (NKDEP) does



   not endorse the use of the MDRD equation for patients that



   are not between the ages of 18 and 70, are pregnant, have



   extremes of body size, muscle mass, or nutritional status,



   or are non- or non-.



   According to the National Kidney Foundation, irrespective of



   diagnosis, the stage of the disease is based on the level of



   kidney function:



   Stage Description                      GFR(mL/min/1.73 m(2))



   1     Kidney damage with normal or decreased GFR       90



   2     Kidney damage with mild decrease in GFR          60-89



   3     Moderate decrease in GFR                         30-59



   4     Severe decrease in GFR                           15-29



   5     Kidney failure                       <15 (or dialysis)

 

 26  Desirable <150



   Borderline high 150-199



   High 200-499



   Very High >500

 

 27  Desirable <200



   Borderline high 200-239



   High >239

 

 28  Low <40



   Desirable: 40-60



   High: >60

 

 29  Desirable: <100 mg/dL



   Near Optimal: 100-129 mg/dL



   Borderline High: 130-159 mg/dL



   High: 160-189 mg/dL



   Very High: >189 mg/dL

 

 30  *******Because ethnic data is not always readily available,



   this report includes an eGFR for both -Americans and



   non- Americans.****



   The National Kidney Disease Education Program (NKDEP) does



   not endorse the use of the MDRD equation for patients that



   are not between the ages of 18 and 70, are pregnant, have



   extremes of body size, muscle mass, or nutritional status,



   or are non- or non-.



   According to the National Kidney Foundation, irrespective of



   diagnosis, the stage of the disease is based on the level of



   kidney function:



   Stage Description                      GFR(mL/min/1.73 m(2))



   1     Kidney damage with normal or decreased GFR       90



   2     Kidney damage with mild decrease in GFR          60-89



   3     Moderate decrease in GFR                         30-59



   4     Severe decrease in GFR                           15-29



   5     Kidney failure                       <15 (or dialysis)

 

 31  Desirable <150



   Borderline high 150-199



   High 200-499



   Very High >500

 

 32  Desirable <200



   Borderline high 200-239



   High >239

 

 33  Low <40



   Desirable: 40-60



   High: >60

 

 34  Desirable: <100 mg/dL



   Near Optimal: 100-129 mg/dL



   Borderline High: 130-159 mg/dL



   High: 160-189 mg/dL



   Very High: >189 mg/dL

 

 35  Desirable <150



   Borderline high 150-199



   High 200-499



   Very High >500

 

 36  Desirable <200



   Borderline high 200-239



   High >239

 

 37  Low <40



   Desirable: 40-60



   High: >60

 

 38  Desirable: <100 mg/dL



   Near Optimal: 100-129 mg/dL



   Borderline High: 130-159 mg/dL



   High: 160-189 mg/dL



   Very High: >189 mg/dL

 

 39  Normal Range 180 to 914



   Indeterminate Range 145 to 180



   Deficient Range  <145

 

 40  *******Because ethnic data is not always readily available,



   this report includes an eGFR for both -Americans and



   non- Americans.****



   The National Kidney Disease Education Program (NKDEP) does



   not endorse the use of the MDRD equation for patients that



   are not between the ages of 18 and 70, are pregnant, have



   extremes of body size, muscle mass, or nutritional status,



   or are non- or non-.



   According to the National Kidney Foundation, irrespective of



   diagnosis, the stage of the disease is based on the level of



   kidney function:



   Stage Description                      GFR(mL/min/1.73 m(2))



   1     Kidney damage with normal or decreased GFR       90



   2     Kidney damage with mild decrease in GFR          60-89



   3     Moderate decrease in GFR                         30-59



   4     Severe decrease in GFR                           15-29



   5     Kidney failure                       <15 (or dialysis)

 

 41  Desirable <150



   Borderline high 150-199



   High 200-499



   Very High >500

 

 42  Desirable <200



   Borderline high 200-239



   High >239

 

 43  Low <40



   Desirable: 40-60



   High: >60

 

 44  Desirable: <100 mg/dL



   Near Optimal: 100-129 mg/dL



   Borderline High: 130-159 mg/dL



   High: 160-189 mg/dL



   Very High: >189 mg/dL

 

 45  *******Because ethnic data is not always readily available,



   this report includes an eGFR for both -Americans and



   non- Americans.****



   The National Kidney Disease Education Program (NKDEP) does



   not endorse the use of the MDRD equation for patients that



   are not between the ages of 18 and 70, are pregnant, have



   extremes of body size, muscle mass, or nutritional status,



   or are non- or non-.



   According to the National Kidney Foundation, irrespective of



   diagnosis, the stage of the disease is based on the level of



   kidney function:



   Stage Description                      GFR(mL/min/1.73 m(2))



   1     Kidney damage with normal or decreased GFR       90



   2     Kidney damage with mild decrease in GFR          60-89



   3     Moderate decrease in GFR                         30-59



   4     Severe decrease in GFR                           15-29



   5     Kidney failure                       <15 (or dialysis)

 

 46  *******Because ethnic data is not always readily available,



   this report includes an eGFR for both -Americans and



   non- Americans.****



   The National Kidney Disease Education Program (NKDEP) does



   not endorse the use of the MDRD equation for patients that



   are not between the ages of 18 and 70, are pregnant, have



   extremes of body size, muscle mass, or nutritional status,



   or are non- or non-.



   According to the National Kidney Foundation, irrespective of



   diagnosis, the stage of the disease is based on the level of



   kidney function:



   Stage Description                      GFR(mL/min/1.73 m(2))



   1     Kidney damage with normal or decreased GFR       90



   2     Kidney damage with mild decrease in GFR          60-89



   3     Moderate decrease in GFR                         30-59



   4     Severe decrease in GFR                           15-29



   5     Kidney failure                       <15 (or dialysis)

 

 47  *******Because ethnic data is not always readily available,



   this report includes an eGFR for both -Americans and



   non- Americans.****



   The National Kidney Disease Education Program (NKDEP) does



   not endorse the use of the MDRD equation for patients that



   are not between the ages of 18 and 70, are pregnant, have



   extremes of body size, muscle mass, or nutritional status,



   or are non- or non-.



   According to the National Kidney Foundation, irrespective of



   diagnosis, the stage of the disease is based on the level of



   kidney function:



   Stage Description                      GFR(mL/min/1.73 m(2))



   1     Kidney damage with normal or decreased GFR       90



   2     Kidney damage with mild decrease in GFR          60-89



   3     Moderate decrease in GFR                         30-59



   4     Severe decrease in GFR                           15-29



   5     Kidney failure                       <15 (or dialysis)

 

 48  PT IS FASTING

 

 49  *******Because ethnic data is not always readily available,



   this report includes an eGFR for both -Americans and



   non- Americans.****



   The National Kidney Disease Education Program (NKDEP) does



   not endorse the use of the MDRD equation for patients that



   are not between the ages of 18 and 70, are pregnant, have



   extremes of body size, muscle mass, or nutritional status,



   or are non- or non-.



   According to the National Kidney Foundation, irrespective of



   diagnosis, the stage of the disease is based on the level of



   kidney function:



   Stage Description                      GFR(mL/min/1.73 m(2))



   1     Kidney damage with normal or decreased GFR       90



   2     Kidney damage with mild decrease in GFR          60-89



   3     Moderate decrease in GFR                         30-59



   4     Severe decrease in GFR                           15-29



   5     Kidney failure                       <15 (or dialysis)

 

 50  PT IS FASTING

 

 51  PT IS FASTING

 

 52  Normal Range 180 to 914



   Indeterminate Range 145 to 180



   Deficient Range  <145

 

 53  Desirable <150



   Borderline high 150-199



   High 200-499



   Very High >500

 

 54  Desirable <200



   Borderline high 200-239



   High >239

 

 55  Low <40



   Desirable: 40-60



   High: >60

 

 56  Desirable: <100 mg/dL



   Near Optimal: 100-129 mg/dL



   Borderline High: 130-159 mg/dL



   High: 160-189 mg/dL



   Very High: >189 mg/dL







Procedures







 Date  Code  Description  Status

 

 2019  64027719  Mammogram  Completed

 

 2019  [id number]  Colonoscopy  Completed

 

 10/23/2017  [id number]  Bone Mineral Density Test  Completed

 

 10/23/2017  [id number]  Mammogram  Completed

 

 2016  [id number]  Bone Mineral Density Test  Completed

 

 10/02/2015  [id number]  Mammogram  Completed

 

 2013  [id number]  Bone Mineral Density Test  Completed

 

 10/01/2012  [id number]  Colonoscopy  Completed

 

 2011  [id number]  Bone Mineral Density Test  Completed

 

 2008  [id number]  Bone Mineral Density Test  Completed

 

 2005  [id number]  Bone Mineral Density Test  Completed







Encounters







 Type  Date  Location  Provider  Dx  Diagnosis

 

 Office Visit  05/15/2019  9:40a  Main Office  Billy Velasquez MD  M54.2  
Cervicalgia









 K63.5  Polyp of colon

 

 M16.12  Unilateral primary osteoarthritis, left hip









 Office Visit  10/29/2018  1:00p  Main Office  Billy SHIELDS  Z00.01  Encounter for



       MD Ron    general adult



           medical exam w



           abnormal findings









 I10  Essential (primary) hypertension

 

 E55.9  Vitamin D deficiency, unspecified

 

 M16.12  Unilateral primary osteoarthritis, left hip

 

 M25.552  Pain in left hip









 Office Visit  2018 11:20a  Main Office  Billy SHIELDS  Z01.818  Encounter 
for yadira Velasquez MD    preprocedural



           examination









 H26.9  Unspecified cataract

 

 M25.552  Pain in left hip

 

 M25.551  Pain in right hip









 Office Visit  2018  1:20p  Main Office  Billy SHIELDS  Z01.818  Encounter 
for yadira Velasquez MD    preprocedural



           examination









 H26.9  Unspecified cataract









 Office Visit  2018 11:20a  Main Office  Billy SHIELDS  I10  Essential (
primary)



       MD Ron    hypertension









 M79.1  Myalgia

 

 E78.00  Pure hypercholesterolemia, unspecified

 

 E55.9  Vitamin D deficiency, unspecified

 

 F43.21  Adjustment disorder with depressed mood









 Office Visit  10/02/2017  1:40p  Main Office  Billy Velasquez MD  M79.1  
Myalgia









 E78.00  Pure hypercholesterolemia, unspecified

 

 I10  Essential (primary) hypertension

 

 Z23  Encounter for immunization









 Office Visit  2017 10:00a  Main Office  Cervantes A  Z00.01  Encounter for



       MD Ron    general adult



           medical exam w



           abnormal findings









 I10  Essential (primary) hypertension

 

 E78.00  Pure hypercholesterolemia, unspecified

 

 M79.1  Myalgia

 

 E55.9  Vitamin D deficiency, unspecified









 Office Visit  2017 11:40a  Main Office  Billy SHIELDS  I10  Essential (
primary)



       MD Ron    hypertension









 E78.00  Pure hypercholesterolemia, unspecified









 Office Visit  2016  1:00p  Main Office  Billy SHIELDS  J20.9  Acute 
bronchitis,



       MD Ron    unspecified









 K21.9  Gastro-esophageal reflux disease without esophagitis









 Office Visit  2016 12:00p  Main Office  Billy SHIELDS  B34.9  Viral 
infection,



       MD Ron    unspecified

 

 Office Visit  2016 10:00a  Main Office  Billy SHIELDS  Z00.01  Encounter for



       MD Ron    general adult



           medical exam w



           abnormal findings









 I10  Essential (primary) hypertension

 

 E78.0  Pure hypercholesterolemia

 

 E55.9  Vitamin D deficiency, unspecified

 

 M15.0  Primary generalized (osteo)arthritis

 

 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









 Office Visit  2016 10:20a  Main Office  Billy SHIELDS  I10  Essential (
primary)



       MD Ron    hypertension









 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









 Office Visit  2016  4:00p  Main Office  Billy SHIELDS  J02.9  Acute 
pharyngitis,



       MD Ron    unspecified









 R05  Cough

 

 K21.9  Gastro-esophageal reflux disease without esophagitis









 Office Visit  02/15/2016 11:00a  Main Office  Billy SHIELDS  I10  Essential (
primary)



       MD Ron    hypertension









 E78.0  Pure hypercholesterolemia

 

 E55.9  Vitamin D deficiency, unspecified

 

 M54.31  Sciatica, right side

 

 K21.9  Gastro-esophageal reflux disease without esophagitis

 

 K22.8  Other specified diseases of esophagus









 Office Visit  10/22/2015 11:40a  Main Office  Billy SHIELDS  I10  Essential (
primary)



       MD Ron    hypertension

 

 Office Visit  2015 10:40a  Main Office  Billy SHIELDS  R05  Cough



       MD Ron    









 K21.9  Gastro-esophageal reflux disease without esophagitis

 

 E55.9  Vitamin D deficiency, unspecified

 

 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

 

 I10  Essential (primary) hypertension

 

 E78.0  Pure hypercholesterolemia

 

 M79.1  Myalgia









 Office Visit  2015  3:00p  Main Office  Billy SHIELDS  V70.0  Examination 
General



       MD Ron    Medical Routine AT



           Health Care



           Facility









 737.19  Kyphosis Other Acquired

 

 715.09  Osteoarthrosis Generalized Multiple Sites

 

 727.1  Bunion

 

 401.9  Hypertension Unspec

 

 272.0  Hypercholesterolemia Pure

 

 733.90  Bone & Cartilage Disorder Unspec

 

 V58.69  Medications Long Term (Current) Use Encounter







Plan of Treatment

05/15/2019 - Billy Velasquez, MDM54.2 CervicalgiaNew Therapy:Comments:PT 
recommended.Muscle spasm with underlying DJD of neck.K63.5 Polyp of 
colonComments:Pathology report d/w and advised to follow after 5 years for 
repeat test.M16.12 Unilateral primary osteoarthritis, left hipComments:Options d
/w in detail.She is following with Dr. Vargas.

## 2019-07-31 LAB
ANION GAP SERPL CALC-SCNC: 8 MMOL/L (ref 2–11)
BUN SERPL-MCNC: 15 MG/DL (ref 6–24)
BUN/CREAT SERPL: 17.9 (ref 8–20)
CALCIUM SERPL-MCNC: 8.2 MG/DL (ref 8.6–10.3)
CHLORIDE SERPL-SCNC: 97 MMOL/L (ref 101–111)
GLUCOSE SERPL-MCNC: 112 MG/DL (ref 70–100)
HCO3 SERPL-SCNC: 27 MMOL/L (ref 22–32)
HCT VFR BLD AUTO: 30 % (ref 35–47)
HGB BLD-MCNC: 10.5 G/DL (ref 12–16)
MAGNESIUM SERPL-MCNC: 1.6 MG/DL (ref 1.9–2.7)
PLATELET # BLD AUTO: 192 10^3/UL (ref 150–450)
POTASSIUM SERPL-SCNC: 3.5 MMOL/L (ref 3.5–5)
SODIUM SERPL-SCNC: 132 MMOL/L (ref 135–145)
TROPONIN I SERPL-MCNC: 0 NG/ML (ref ?–0.04)

## 2019-07-31 RX ADMIN — Medication SCH: at 09:26

## 2019-07-31 RX ADMIN — OXYCODONE HYDROCHLORIDE PRN MG: 5 CAPSULE ORAL at 05:09

## 2019-07-31 RX ADMIN — ACETAMINOPHEN SCH: 325 TABLET ORAL at 23:57

## 2019-07-31 RX ADMIN — ACETAMINOPHEN SCH: 325 TABLET ORAL at 16:23

## 2019-07-31 RX ADMIN — ACETAMINOPHEN SCH MG: 325 TABLET ORAL at 00:21

## 2019-07-31 RX ADMIN — TRAMADOL HYDROCHLORIDE PRN MG: 50 TABLET, FILM COATED ORAL at 22:04

## 2019-07-31 RX ADMIN — DOCUSATE SODIUM SCH MG: 100 CAPSULE, LIQUID FILLED ORAL at 09:24

## 2019-07-31 RX ADMIN — APIXABAN SCH MG: 2.5 TABLET, FILM COATED ORAL at 22:04

## 2019-07-31 RX ADMIN — DOCUSATE SODIUM SCH MG: 100 CAPSULE, LIQUID FILLED ORAL at 22:03

## 2019-07-31 RX ADMIN — Medication SCH UNITS: at 09:23

## 2019-07-31 RX ADMIN — POTASSIUM CHLORIDE SCH MEQ: 750 TABLET, FILM COATED, EXTENDED RELEASE ORAL at 22:03

## 2019-07-31 RX ADMIN — APIXABAN SCH: 2.5 TABLET, FILM COATED ORAL at 22:16

## 2019-07-31 RX ADMIN — TRAMADOL HYDROCHLORIDE PRN MG: 50 TABLET, FILM COATED ORAL at 23:58

## 2019-07-31 RX ADMIN — ACETAMINOPHEN SCH MG: 325 TABLET ORAL at 09:23

## 2019-07-31 RX ADMIN — OXYCODONE HYDROCHLORIDE PRN MG: 5 CAPSULE ORAL at 13:06

## 2019-07-31 RX ADMIN — APIXABAN SCH MG: 2.5 TABLET, FILM COATED ORAL at 23:57

## 2019-07-31 RX ADMIN — PANTOPRAZOLE SODIUM SCH MG: 40 TABLET, DELAYED RELEASE ORAL at 18:08

## 2019-07-31 RX ADMIN — DOCUSATE SODIUM SCH: 100 CAPSULE, LIQUID FILLED ORAL at 22:17

## 2019-07-31 RX ADMIN — SODIUM CHLORIDE, SODIUM LACTATE, POTASSIUM CHLORIDE, AND CALCIUM CHLORIDE SCH MLS/HR: 600; 310; 30; 20 INJECTION, SOLUTION INTRAVENOUS at 02:35

## 2019-07-31 RX ADMIN — APIXABAN SCH MG: 2.5 TABLET, FILM COATED ORAL at 09:24

## 2019-07-31 RX ADMIN — CEFAZOLIN SCH MLS/HR: 1 INJECTION, POWDER, FOR SOLUTION INTRAVENOUS at 02:23

## 2019-07-31 RX ADMIN — POTASSIUM CHLORIDE SCH: 750 TABLET, FILM COATED, EXTENDED RELEASE ORAL at 22:17

## 2019-07-31 RX ADMIN — MAGNESIUM HYDROXIDE SCH: 400 SUSPENSION ORAL at 21:39

## 2019-07-31 RX ADMIN — POTASSIUM CHLORIDE SCH MEQ: 750 TABLET, FILM COATED, EXTENDED RELEASE ORAL at 09:24

## 2019-07-31 RX ADMIN — MAGNESIUM HYDROXIDE SCH ML: 400 SUSPENSION ORAL at 09:25

## 2019-07-31 RX ADMIN — CEFAZOLIN SCH MLS/HR: 1 INJECTION, POWDER, FOR SOLUTION INTRAVENOUS at 11:36

## 2019-07-31 RX ADMIN — TRAMADOL HYDROCHLORIDE PRN MG: 50 TABLET, FILM COATED ORAL at 16:07

## 2019-07-31 NOTE — PN
Progress Note





- Progress Note


Date of Service: 07/31/19


SOAP: 


Subjective:


Pt. reports L hip pain is moderate, denies cp/sob/palpitations overnight.  

Nursing reports telemetry without abnormalities overnight.  





Objective:


Vital Signs:











Temp Pulse Resp BP Pulse Ox


 


 97.2 F   80   16   137/54   97 


 


 07/31/19 01:49  07/31/19 01:49  07/31/19 05:09  07/31/19 01:49  07/31/19 01:49








 Laboratory Results - last 24 hr











  07/30/19 07/30/19 07/30/19





  13:46 13:46 13:46


 


WBC    8.2


 


RBC    4.01


 


Hgb    12.6


 


Hct    37


 


MCV    92


 


MCH    31


 


MCHC    34


 


RDW    14


 


Plt Count    222


 


MPV    8.1


 


Neut % (Auto)    72.2


 


Lymph % (Auto)    20.6


 


Mono % (Auto)    6.0


 


Eos % (Auto)    0.9


 


Baso % (Auto)    0.3


 


Absolute Neuts (auto)    5.9


 


Absolute Lymphs (auto)    1.7


 


Absolute Monos (auto)    0.5


 


Absolute Eos (auto)    0.1


 


Absolute Basos (auto)    0.0


 


Absolute Nucleated RBC    0.0


 


Nucleated RBC %    0.0


 


Sodium   140 


 


Potassium   4.1 


 


Chloride   103 


 


Carbon Dioxide   29 


 


Anion Gap   8 


 


BUN   16 


 


Creatinine   0.81 


 


Est GFR ( Amer)   83.2 


 


Est GFR (Non-Af Amer)   68.7 


 


BUN/Creatinine Ratio   19.8 


 


Glucose   101 H 


 


Calcium   9.4 


 


Magnesium   


 


Total Bilirubin   0.50 


 


AST   32 


 


ALT   20 


 


Alkaline Phosphatase   48 


 


Troponin I  0.01  


 


Total Protein   6.7 


 


Albumin   4.0 


 


Globulin   2.7 


 


Albumin/Globulin Ratio   1.5 














  07/30/19 07/31/19 07/31/19





  17:31 05:42 05:42


 


WBC   


 


RBC   


 


Hgb   10.5 L 


 


Hct   30 L 


 


MCV   


 


MCH   


 


MCHC   


 


RDW   


 


Plt Count   192 


 


MPV   7.7 


 


Neut % (Auto)   


 


Lymph % (Auto)   


 


Mono % (Auto)   


 


Eos % (Auto)   


 


Baso % (Auto)   


 


Absolute Neuts (auto)   


 


Absolute Lymphs (auto)   


 


Absolute Monos (auto)   


 


Absolute Eos (auto)   


 


Absolute Basos (auto)   


 


Absolute Nucleated RBC   


 


Nucleated RBC %   


 


Sodium    132 L D


 


Potassium    3.5


 


Chloride    97 L


 


Carbon Dioxide    27


 


Anion Gap    8


 


BUN    15


 


Creatinine    0.84


 


Est GFR ( Amer)    79.8


 


Est GFR (Non-Af Amer)    65.9


 


BUN/Creatinine Ratio    17.9


 


Glucose    112 H


 


Calcium  9.0   8.2 L


 


Magnesium  1.6 L   1.6 L


 


Total Bilirubin   


 


AST   


 


ALT   


 


Alkaline Phosphatase   


 


Troponin I   


 


Total Protein   


 


Albumin   


 


Globulin   


 


Albumin/Globulin Ratio   








LLE - dressing c/d/i.  thigh soft and compressible.  +df/pf, full sens lt, 2+ 

dp pulse.  





Assessment:


75 yo F pod 1 s/p LTHA.  Placed on telemetry unit for intraop ekg changes and 

pacu vtach short run.  








Plan:


Appreciate hospitalist management. 


Magnesium was given, will add some PO potassium for 3.5 level this am.  


PT/OT with posterior hip precautions.  


Eliquis for dvt proph.  


Prn analgesia


SCD's/IS at bedside.  


Plan to transfer to [facility] this AM if hospitalist group feels this is appropriate.

## 2019-07-31 NOTE — PN
Subjective


Date of Service: 07/31/19


Interval History: 





P feels well, no post op pain when in bed.


No arrhythmias on telem overnight





Objective


Active Medications: 








Acetaminophen (Tylenol Tab*)  975 mg PO Q8H [facility]


   Last Admin: 07/31/19 00:21 Dose:  975 mg


Apixaban (Eliquis*)  2.5 mg PO BID [facility]


   Last Admin: 07/30/19 22:18 Dose:  2.5 mg


Bisacodyl (Dulcolax Supp*)  10 mg CT DAILY PRN


   PRN Reason: constipation


Cholecalciferol (Vitamin D Tab*)  2,000 units PO DAILY [facility]


Coenzyme Q10 (Coenzyme Q10 (Nf))  1 cap PO QAM [facility]


Cyclobenzaprine HCl (Flexeril Tab*)  10 mg PO TID PRN


   PRN Reason: SPASMS


Diphenhydramine HCl (Benadryl Iv*)  25 mg IV Q6H PRN


   PRN Reason: itching


Diphenhydramine HCl (Benadryl Po*)  25 mg PO Q6H PRN


   PRN Reason: itching


Docusate Sodium (Colace Cap*)  100 mg PO BID [facility]


   Last Admin: 07/30/19 22:19 Dose:  100 mg


Cefazolin Sodium 1 gm/ Sodium (Chloride)  50 mls @ 200 mls/hr IVPB Q8H [facility]


   Stop: 07/31/19 10:14


   Last Admin: 07/31/19 02:23 Dose:  200 mls/hr


Lactulose (Lactulose*)  30 ml PO Q6H PRN


   PRN Reason: constipation


Magnesium Hydroxide (Milk Of Magnesia Liq*)  30 ml PO BID [facility]


   Last Admin: 07/30/19 22:19 Dose:  30 ml


Magnesium Hydroxide (Milk Of Magnesia Liq*)  30 ml PO Q6H PRN


   PRN Reason: constipation


Morphine Sulfate (Morphine 4 Mg/Ml Vial (1 Ml))  2 mg IV Q2H PRN


   PRN Reason: SEVERE PAIN


   Last Admin: 07/30/19 17:03 Dose:  2 mg


Ondansetron HCl (Zofran Inj*)  4 mg IV Q6H PRN


   PRN Reason: nausea


   Last Admin: 07/30/19 17:12 Dose:  4 mg


Ondansetron HCl (Zofran Odt Tab*)  4 mg PO Q6H PRN


   PRN Reason: NAUSEA


Oxycodone HCl (Roxycodone Tab*)  10 mg PO Q4H PRN


   PRN Reason: PAIN - SEVERE


   Last Admin: 07/31/19 05:09 Dose:  10 mg


Oxycodone/Acetaminophen (Percocet 5/325 Tab*)  1 tab PO Q4H PRN


   PRN Reason: PAIN - MILD


   Last Admin: 07/30/19 22:19 Dose:  1 tab


Oxycodone/Acetaminophen (Percocet 5/325 Tab*)  2 tab PO Q4H PRN


   PRN Reason: PAIN - MODERATE


   Last Admin: 07/30/19 17:56 Dose:  2 tab


Pantoprazole Sodium (Protonix Tab*)  40 mg PO QPM [facility]


   Last Admin: 07/30/19 17:03 Dose:  40 mg


Polyethylene Glycol/Electrolytes (Miralax*)  17 gm PO DAILY PRN


   PRN Reason: Constipation


Potassium Chloride (Klor Con Er Tab*)  10 meq PO BID [facility]


Temazepam (Restoril Cap*)  15 mg PO BEDTIME PRN


   PRN Reason: INSOMNIA


Tramadol HCl (Ultram*)  50 mg PO Q6H PRN


   PRN Reason: PAIN








 Vital Signs - 8 hr











  07/31/19 07/31/19 07/31/19





  01:49 05:09 07:22


 


Temperature 97.2 F  98.2 F


 


Pulse Rate 80  83


 


Respiratory 14 16 14





Rate   


 


Blood Pressure 137/54  129/46





(mmHg)   


 


O2 Sat by Pulse 97  91





Oximetry   














  07/31/19





  08:25


 


Temperature 


 


Pulse Rate 


 


Respiratory 16





Rate 


 


Blood Pressure 





(mmHg) 


 


O2 Sat by Pulse 





Oximetry 











Oxygen Devices in Use Now: None


Appearance: 73 yo F in nAD, AAOx3


Eyes: No Scleral Icterus, PERRLA


Ears/Nose/Mouth/Throat: NL Teeth, Lips, Gums, Mucous Membranes Moist


Neck: NL Appearance and Movements; NL JVP, Trachea Midline


Respiratory: Symmetrical Chest Expansion and Respiratory Effort, Clear to 

Auscultation


Cardiovascular: NL Sounds; No Murmurs; No JVD


Abdominal: NL Sounds; No Tenderness; No Distention


Lymphatic: No Cervical Adenopathy


Extremities: No Clubbing, Cyanosis


Skin: No Nodules or Sclerosis, - - left hip in post op dressing clean -not 

removed


Neurological: Alert and Oriented x 3, NL Muscle Strength and Tone


Result Diagrams: 


 07/31/19 05:42





 07/31/19 05:42





Assess/Plan/Problems-Billing


Assessment: 73 yo F with h/o PVC's and HTN s/p elective left hip replacement











- Patient Problems


(1) Status post left hip replacement


Comment: as per orthopedic sevice   





(2) Acute electrocardiography changes


Comment: intraop, asymptomatic, resolved.


Post op 6 beats of v. tach in face of hypomagnesemia (replaced)-remaining 12 

hrs of telem with PVC's (known h/o)


Will d/c telem.


Echo  shows good EF, no wall motion abn. Trop neg.


OK to transfer to [facility]   





(3) HTN (hypertension)


Comment: HCTZ held   





(4) Anemia


Comment: acute, postoperative. Pt asymptomatic   





(5) DVT prophylaxis


Comment: Eliquis   


Status and Disposition: 


Medicine consult, will follow

## 2019-08-01 VITALS — SYSTOLIC BLOOD PRESSURE: 110 MMHG | DIASTOLIC BLOOD PRESSURE: 58 MMHG

## 2019-08-01 LAB
ANION GAP SERPL CALC-SCNC: 7 MMOL/L (ref 2–11)
BASOPHILS # BLD AUTO: 0 10^3/UL (ref 0–0.2)
BUN SERPL-MCNC: 8 MG/DL (ref 6–24)
BUN/CREAT SERPL: 10.7 (ref 8–20)
CALCIUM SERPL-MCNC: 8.1 MG/DL (ref 8.6–10.3)
CHLORIDE SERPL-SCNC: 96 MMOL/L (ref 101–111)
EOSINOPHIL # BLD AUTO: 0.1 10^3/UL (ref 0–0.6)
GLUCOSE SERPL-MCNC: 120 MG/DL (ref 70–100)
HCO3 SERPL-SCNC: 28 MMOL/L (ref 22–32)
HCT VFR BLD AUTO: 30 % (ref 35–47)
HGB BLD-MCNC: 10.3 G/DL (ref 12–16)
LYMPHOCYTES # BLD AUTO: 1.2 10^3/UL (ref 1–4.8)
MAGNESIUM SERPL-MCNC: 2.4 MG/DL (ref 1.9–2.7)
MCH RBC QN AUTO: 31 PG (ref 27–31)
MCHC RBC AUTO-ENTMCNC: 34 G/DL (ref 31–36)
MCV RBC AUTO: 91 FL (ref 80–97)
MONOCYTES # BLD AUTO: 0.6 10^3/UL (ref 0–0.8)
NEUTROPHILS # BLD AUTO: 6.2 10^3/UL (ref 1.5–7.7)
NRBC # BLD AUTO: 0 10^3/UL
NRBC BLD QL AUTO: 0
PLATELET # BLD AUTO: 201 10^3/UL (ref 150–450)
POTASSIUM SERPL-SCNC: 3.8 MMOL/L (ref 3.5–5)
RBC # BLD AUTO: 3.32 10^6 /UL (ref 3.7–4.87)
SODIUM SERPL-SCNC: 131 MMOL/L (ref 135–145)
WBC # BLD AUTO: 8.2 10^3/UL (ref 3.5–10.8)

## 2019-08-01 RX ADMIN — TRAMADOL HYDROCHLORIDE PRN MG: 50 TABLET, FILM COATED ORAL at 12:42

## 2019-08-01 RX ADMIN — DOCUSATE SODIUM SCH MG: 100 CAPSULE, LIQUID FILLED ORAL at 09:15

## 2019-08-01 RX ADMIN — POTASSIUM CHLORIDE SCH MEQ: 750 TABLET, FILM COATED, EXTENDED RELEASE ORAL at 09:15

## 2019-08-01 RX ADMIN — Medication SCH: at 09:19

## 2019-08-01 RX ADMIN — APIXABAN SCH MG: 2.5 TABLET, FILM COATED ORAL at 09:15

## 2019-08-01 RX ADMIN — Medication SCH UNITS: at 09:15

## 2019-08-01 RX ADMIN — ACETAMINOPHEN SCH MG: 325 TABLET ORAL at 07:46

## 2019-08-01 RX ADMIN — MAGNESIUM HYDROXIDE SCH: 400 SUSPENSION ORAL at 09:40

## 2019-08-01 NOTE — PN
Progress Note





- Progress Note


Date of Service: 08/01/19


SOAP: 


Subjective:


[]Pt seen and examined OOB in chair. She feels very well today, left hip pain 

is well controlled. Denies chest pain, shortness of breath, dizziness, nausea, 

abdominal pain.








Objective:


[]Gen: Appears well, NAD


LLE: Left hip dressing changed by Dr Vargas this morning, remains CDI no 

surrounding erythema. Thigh is soft, DF/PF intact, DP2+, sensation intact to 

light touch distally.


Calves supple and nontender without erythema, edema or palpable cords 





Assessment:


[]75 yo F pod 2 s/p LTHA





Plan:


Appreciate hospitalist management. 


PT/OT with posterior hip precautions.  


Eliquis for dvt proph. x 30 days post op


DC home today with VNS 


Will obtain another set of vitals prior to DC





 Vital Signs











Temp  99.0 F   08/01/19 07:18


 


Pulse  98   08/01/19 07:18


 


Resp  16   08/01/19 07:18


 


BP  149/47   08/01/19 07:18


 


Pulse Ox  95   08/01/19 08:10








 Intake & Output











 07/31/19 08/01/19 08/01/19





 18:59 06:59 18:59


 


Intake Total 600 910 480


 


Output Total 1400 1250 


 


Balance -800 -340 480


 


Intake:   


 


  Oral 600 910 480


 


Output:   


 


  Urine 850 1250 


 


  Gutierrez 550  


 


Other:   


 


  Estimated Void  Medium Small


 


  Date of Last Bowel  0 





  Movement   


 


  # Bowel Movements 1 1 1


 


  Estimated Stool Amount Small Small Large


 


  # Voids  1 1








 Laboratory Last Values











WBC  8.2 10^3/uL (3.5-10.8)   08/01/19  05:53    


 


RBC  3.32 10^6 /uL (3.70-4.87)  L  08/01/19  05:53    


 


Hgb  10.3 g/dL (12.0-16.0)  L  08/01/19  05:53    


 


Hct  30 % (35-47)  L  08/01/19  05:53    


 


MCV  91 fL (80-97)   08/01/19  05:53    


 


MCH  31 pg (27-31)   08/01/19  05:53    


 


MCHC  34 g/dL (31-36)   08/01/19  05:53    


 


RDW  14 % (10-15)   08/01/19  05:53    


 


Plt Count  201 10^3/uL (150-450)   08/01/19  05:53    


 


MPV  7.5 fL (7.4-10.4)   08/01/19  05:53    


 


Neut % (Auto)  76.2 %  08/01/19  05:53    


 


Lymph % (Auto)  15.0 %  08/01/19  05:53    


 


Mono % (Auto)  7.9 %  08/01/19  05:53    


 


Eos % (Auto)  0.6 %  08/01/19  05:53    


 


Baso % (Auto)  0.3 %  08/01/19  05:53    


 


Absolute Neuts (auto)  6.2 10^3/ul (1.5-7.7)   08/01/19  05:53    


 


Absolute Lymphs (auto)  1.2 10^3/ul (1.0-4.8)   08/01/19  05:53    


 


Absolute Monos (auto)  0.6 10^3/ul (0-0.8)   08/01/19  05:53    


 


Absolute Eos (auto)  0.1 10^3/ul (0-0.6)   08/01/19  05:53    


 


Absolute Basos (auto)  0.0 10^3/ul (0-0.2)   08/01/19  05:53    


 


Absolute Nucleated RBC  0.0 10^3/ul  08/01/19  05:53    


 


Nucleated RBC %  0.0   08/01/19  05:53    


 


Sodium  131 mmol/L (135-145)  L  08/01/19  05:53    


 


Potassium  3.8 mmol/L (3.5-5.0)   08/01/19  05:53    


 


Chloride  96 mmol/L (101-111)  L  08/01/19  05:53    


 


Carbon Dioxide  28 mmol/L (22-32)   08/01/19  05:53    


 


Anion Gap  7 mmol/L (2-11)   08/01/19  05:53    


 


BUN  8 mg/dL (6-24)   08/01/19  05:53    


 


Creatinine  0.75 mg/dL (0.51-0.95)   08/01/19  05:53    


 


Est GFR ( Amer)  90.9  (>60)   08/01/19  05:53    


 


Est GFR (Non-Af Amer)  75.1  (>60)   08/01/19  05:53    


 


BUN/Creatinine Ratio  10.7  (8-20)   08/01/19  05:53    


 


Glucose  120 mg/dL ()  H  08/01/19  05:53    


 


Calcium  8.1 mg/dL (8.6-10.3)  L  08/01/19  05:53    


 


Magnesium  2.4 mg/dL (1.9-2.7)   08/01/19  05:53    


 


Total Bilirubin  0.50 mg/dL (0.2-1.0)   07/30/19  13:46    


 


AST  32 U/L (13-39)   07/30/19  13:46    


 


ALT  20 U/L (7-52)   07/30/19  13:46    


 


Alkaline Phosphatase  48 U/L ()   07/30/19  13:46    


 


Troponin I  0.00 ng/mL (<0.04)   07/31/19  05:42    


 


Total Protein  6.7 g/dL (6.4-8.9)   07/30/19  13:46    


 


Albumin  4.0 g/dL (3.2-5.2)   07/30/19  13:46    


 


Globulin  2.7 g/dL (2-4)   07/30/19  13:46    


 


Albumin/Globulin Ratio  1.5  (1-3)   07/30/19  13:46

## 2019-08-01 NOTE — DS
Orthopedic Discharge Summary





- Discharge Summary





Date of Admission:19


Date of Discharge: 19


Date of Surgery: 19


Attending Orthopedic Provider: Dr Vargas


Pre-operative Diagnosis: Left hip osteoarthritis


Operative Procedure: left total hip replacement


Disposition of Patient: home


Condition of Patient: stable


History: JERZY CARDOZA is a 74 year old F with years of increasingly severe 

left hip pain. Patient has failed conservative management and has elected to 

undergo a left total hip replacement


Hospital Course: JERZY was admitted to [facility] on 19. 

Patient underwent a left total hip replacement without complication followed by 

a brief recovery in PACU where she had a short run of vtach and transfer to the 

Tele Unit in stable condition. Our hospitalist service, physical therapy and 

occupational therapy also participated in this patients care. Post-op day 1: 

patient was alert and in no acute distress. Dressing was clean, dry and intact. 

Operative extremity dorsiflexion and plantarflexion intact, sensation intact to 

light touch distally, DP2+. She was transferred back to [facility] in sinus rhythm. 

Post-op day two: dressing was changed, incision was clean, dry and intact. 

Patient was deemed to be medically and orthopedically stable for discharge. 

Physical therapy goals were met. Patient discussed with medicine, she is safe 

for DC home on home meds with repeat BMP in 2 days. Please follow up with PCP 

in 1 week to discuss post op vtach.





 Home Medications











 Medication  Instructions  Recorded  Confirmed  Type


 


Losartan Potassium 25 mg PO QPM 10/22/14 07/30/19 History


 


Cholecalciferol TAB* [Vitamin D 2,000 units PO DAILY 18 History





TAB*]    


 


Coq10 1 Cap 200 mg PO QAM 18 History


 


Lecithin 1 Cap 1,200 mg PO QAM 18 History


 


Pantoprazole TAB (NF) [Protonix 40 mg PO QPM 18 History





TAB (NF)]    


 


Vitamin B Tablet 1 tab PO QAM 18 History


 


hydroCHLOROthiazide 12.5 mg PO QAM 18 History





Hydrochlorothiazide    


 


Acetaminophen TAB* [Tylenol TAB*] 975 mg PO Q8H  tab 19  Rx


 


Apixaban* [Eliquis*] 2.5 mg PO BID #60 tab 19  Rx


 


Docusate CAP* [Colace Cap*] 100 mg PO BID PRN #90 cap 19  Rx


 


oxyCODONE TAB* [Roxycodone TAB 5 10 mg PO Q4H PRN #20 tab MDD 4 19  Rx





mg*]    


 


traMADol TAB* [Ultram*] 50 mg PO Q6H PRN #56 tab MDD 8 19  Rx











Discharge Instructions following Orthopedic Surgery:





Activity:  


* Weight Bearing as tolerated


* Continue physical therapy and occupational therapy exercises as shown


* Home Physical therapy





**Hip replacements: Continue Hip Precautions- do not cross legs or bend greater 

than 90 degrees/squat**





Wound care: 


* OK to shower on post-op day 3, no bathing, swimming, or submerging wound. 


* Use gentle soap, pat dry. Cover with gauze, ACE wrap or tape.


* Visiting home nurse to do wound checks.





Labs


Home nursing to repeat BMP within 2 days 





Call Orthopedic office for: 


* Increased drainage


* Redness


* Increased pain


* Fever 





***Go to ER with shortness of breath or chest pain.***





Diet: 


* Regular diet


* Increase fluids and fiber to prevent constipation. 


* Continue to use stool softeners, call office if no bowel motion within 48 

hours.





Medications


See Home Medication List in your packet for medications that you should take 

after discharge.





DVT Prophylaxis:  Medication increases bleeding tendency





   Eliquis Dosin.5 mg, 1 tab every 12 hours x 30 days post op





Pain Control:


   Moderate pain: tramadol 50 mg 1 to 2 tabs every 6 hours as needed, max 8 

tabs per day. Hold for sedation. Wean off as soon as pain allows.


   Severe pain: oxycodone Dosin mg 1-2 tabs by mouth every 4-6 hours as 

needed for pain. Maximum of 4 tabs per day. wean off as soon as pain allows. 

hold for sedation. 





May use over the counter tylenol to supplement pain control. Maximum daily dose 

of Tylenol is 4000 mg from all sources.**





Antibiotics are required prior to any dental work.








FOLLOW UP: Follow up with  [Sam] Within 10-14 days, call for appointment





Please call our office with any questions or concerns ([phone])

## 2023-07-14 ENCOUNTER — LAB OUTSIDE AN ENCOUNTER (OUTPATIENT)
Dept: URBAN - METROPOLITAN AREA CLINIC 124 | Facility: CLINIC | Age: 78
End: 2023-07-14

## 2023-07-14 PROBLEM — 38341003: Status: ACTIVE | Noted: 2023-07-14

## 2023-07-17 ENCOUNTER — LAB OUTSIDE AN ENCOUNTER (OUTPATIENT)
Dept: URBAN - METROPOLITAN AREA CLINIC 124 | Facility: CLINIC | Age: 78
End: 2023-07-17

## 2023-07-17 ENCOUNTER — DASHBOARD ENCOUNTERS (OUTPATIENT)
Age: 78
End: 2023-07-17

## 2023-07-17 ENCOUNTER — OFFICE VISIT (OUTPATIENT)
Dept: URBAN - METROPOLITAN AREA CLINIC 124 | Facility: CLINIC | Age: 78
End: 2023-07-17
Payer: COMMERCIAL

## 2023-07-17 VITALS
DIASTOLIC BLOOD PRESSURE: 73 MMHG | TEMPERATURE: 97.2 F | HEIGHT: 63 IN | SYSTOLIC BLOOD PRESSURE: 121 MMHG | BODY MASS INDEX: 28.95 KG/M2 | WEIGHT: 163.4 LBS | HEART RATE: 71 BPM

## 2023-07-17 DIAGNOSIS — I10 ACCELERATED ESSENTIAL HYPERTENSION: ICD-10-CM

## 2023-07-17 DIAGNOSIS — Z12.11 COLON CANCER SCREENING: ICD-10-CM

## 2023-07-17 DIAGNOSIS — K21.9 ACID REFLUX: ICD-10-CM

## 2023-07-17 DIAGNOSIS — K44.9 HIATAL HERNIA: ICD-10-CM

## 2023-07-17 PROBLEM — 235595009: Status: ACTIVE | Noted: 2023-07-17

## 2023-07-17 PROCEDURE — 99204 OFFICE O/P NEW MOD 45 MIN: CPT | Performed by: INTERNAL MEDICINE

## 2023-07-17 RX ORDER — IBUPROFEN 800 MG/1
TAKE 1 TABLET BY MOUTH 3 TIMES A DAY TABLET, FILM COATED ORAL
Qty: 30 EACH | Refills: 0 | Status: ON HOLD | COMMUNITY

## 2023-07-17 RX ORDER — ROSUVASTATIN CALCIUM 5 MG/1
TABLET, FILM COATED ORAL
Qty: 90 TABLET | Status: ACTIVE | COMMUNITY

## 2023-07-17 RX ORDER — ONDANSETRON 4 MG/1
2 TABLETS TABLET, FILM COATED ORAL
Qty: 2 TABLETS | Refills: 0 | OUTPATIENT
Start: 2023-07-17

## 2023-07-17 RX ORDER — ATENOLOL 25 MG/1
TAKE 1 TABLET BY MOUTH EVERY DAY IN EVENING TABLET ORAL
Qty: 90 EACH | Refills: 1 | Status: ACTIVE | COMMUNITY

## 2023-07-17 RX ORDER — ASPIRIN 81 MG/1
1 TABLET TABLET, COATED ORAL ONCE A DAY
Status: ACTIVE | COMMUNITY

## 2023-07-17 RX ORDER — LOSARTAN POTASSIUM 50 MG/1
TABLET, FILM COATED ORAL
Qty: 180 TABLET | Status: ACTIVE | COMMUNITY

## 2023-07-17 RX ORDER — SODIUM, POTASSIUM,MAG SULFATES 17.5-3.13G
177ML SOLUTION, RECONSTITUTED, ORAL ORAL AS DIRECTED
Qty: 177 MILLILITER | Refills: 0 | OUTPATIENT
Start: 2023-07-17 | End: 2023-07-19

## 2023-07-17 RX ORDER — AMLODIPINE BESYLATE 5 MG/1
TABLET ORAL
Qty: 90 TABLET | Status: ACTIVE | COMMUNITY

## 2023-07-18 ENCOUNTER — TELEPHONE ENCOUNTER (OUTPATIENT)
Dept: URBAN - METROPOLITAN AREA CLINIC 92 | Facility: CLINIC | Age: 78
End: 2023-07-18

## 2023-08-16 NOTE — PHYSICAL EXAM HENT:
Head, normocephalic, atraumatic, Face, Face within normal limits, Ears, External ears within normal limits, Nose/Nasopharynx, External nose normal appearance, nares patent, no nasal discharge, Mouth and Throat, Oral cavity appearance normal, Lips, Appearance normal
depression and lightheadedness/medical evaluation

## 2023-10-12 ENCOUNTER — WEB ENCOUNTER (OUTPATIENT)
Dept: URBAN - METROPOLITAN AREA CLINIC 98 | Facility: CLINIC | Age: 78
End: 2023-10-12

## 2023-11-29 ENCOUNTER — OFFICE VISIT (OUTPATIENT)
Dept: URBAN - METROPOLITAN AREA SURGERY CENTER 18 | Facility: SURGERY CENTER | Age: 78
End: 2023-11-29

## 2024-01-31 ENCOUNTER — OFFICE VISIT (OUTPATIENT)
Dept: URBAN - METROPOLITAN AREA SURGERY CENTER 18 | Facility: SURGERY CENTER | Age: 79
End: 2024-01-31

## 2024-02-08 ENCOUNTER — COL/EGD (OUTPATIENT)
Dept: URBAN - METROPOLITAN AREA SURGERY CENTER 16 | Facility: SURGERY CENTER | Age: 79
End: 2024-02-08
Payer: COMMERCIAL

## 2024-02-08 ENCOUNTER — LAB (OUTPATIENT)
Dept: URBAN - METROPOLITAN AREA CLINIC 4 | Facility: CLINIC | Age: 79
End: 2024-02-08
Payer: COMMERCIAL

## 2024-02-08 DIAGNOSIS — K31.89 ACHYLIA: ICD-10-CM

## 2024-02-08 DIAGNOSIS — K21.9 ACID REFLUX: ICD-10-CM

## 2024-02-08 DIAGNOSIS — R13.19 CERVICAL DYSPHAGIA: ICD-10-CM

## 2024-02-08 DIAGNOSIS — Z12.11 COLON CANCER SCREENING: ICD-10-CM

## 2024-02-08 DIAGNOSIS — K29.70 GASTRITIS, UNSPECIFIED, WITHOUT BLEEDING: ICD-10-CM

## 2024-02-08 PROCEDURE — 43239 EGD BIOPSY SINGLE/MULTIPLE: CPT | Performed by: INTERNAL MEDICINE

## 2024-02-08 PROCEDURE — 88305 TISSUE EXAM BY PATHOLOGIST: CPT | Performed by: PATHOLOGY

## 2024-02-08 PROCEDURE — 88312 SPECIAL STAINS GROUP 1: CPT | Performed by: PATHOLOGY

## 2024-02-08 PROCEDURE — G0121 COLON CA SCRN NOT HI RSK IND: HCPCS | Performed by: INTERNAL MEDICINE

## 2024-02-08 RX ORDER — ONDANSETRON 4 MG/1
2 TABLETS TABLET, FILM COATED ORAL
Qty: 2 TABLETS | Refills: 0 | Status: ACTIVE | COMMUNITY
Start: 2023-07-17

## 2024-02-08 RX ORDER — LOSARTAN POTASSIUM 50 MG/1
TABLET, FILM COATED ORAL
Qty: 180 TABLET | Status: ACTIVE | COMMUNITY

## 2024-02-08 RX ORDER — AMLODIPINE BESYLATE 5 MG/1
TABLET ORAL
Qty: 90 TABLET | Status: ACTIVE | COMMUNITY

## 2024-02-08 RX ORDER — IBUPROFEN 800 MG/1
TAKE 1 TABLET BY MOUTH 3 TIMES A DAY TABLET, FILM COATED ORAL
Qty: 30 EACH | Refills: 0 | Status: ON HOLD | COMMUNITY

## 2024-02-08 RX ORDER — ATENOLOL 25 MG/1
TAKE 1 TABLET BY MOUTH EVERY DAY IN EVENING TABLET ORAL
Qty: 90 EACH | Refills: 1 | Status: ACTIVE | COMMUNITY

## 2024-02-08 RX ORDER — ASPIRIN 81 MG/1
1 TABLET TABLET, COATED ORAL ONCE A DAY
Status: ACTIVE | COMMUNITY

## 2024-02-08 RX ORDER — ROSUVASTATIN CALCIUM 5 MG/1
TABLET, FILM COATED ORAL
Qty: 90 TABLET | Status: ACTIVE | COMMUNITY

## 2024-02-20 ENCOUNTER — COL/EGD (OUTPATIENT)
Dept: URBAN - METROPOLITAN AREA SURGERY CENTER 16 | Facility: SURGERY CENTER | Age: 79
End: 2024-02-20

## 2024-07-17 ENCOUNTER — TELEPHONE ENCOUNTER (OUTPATIENT)
Dept: URBAN - METROPOLITAN AREA CLINIC 23 | Facility: CLINIC | Age: 79
End: 2024-07-17

## 2024-08-29 NOTE — HPI-TODAY'S VISIT:
79 yo fem ref by Dr Fierro for a gi consultation for colon ca screening and a copy of this note will be sent to the ref provider. Pt had a colonoscopy- 5-6 yrs ago- not sure what it showed, nothing was bx or removed- [place], [place] Pt had EGD at same time- it was normal. Pt has hx of hiatal hernia and acid reflux.  Pt is on no meds for reflux and prefers to take prn PPI. Pt only has to drink slowly with large pieces of food. Pt saw cards 4 yrs ago- had 30% stenosis- being treated medically only not with stents. Pts daughter - Ale here- her daughter went to [facility] too. Quality 130: Documentation Of Current Medications In The Medical Record: Current Medications Documented Detail Level: Detailed